# Patient Record
Sex: FEMALE | Race: BLACK OR AFRICAN AMERICAN | NOT HISPANIC OR LATINO | Employment: PART TIME | URBAN - METROPOLITAN AREA
[De-identification: names, ages, dates, MRNs, and addresses within clinical notes are randomized per-mention and may not be internally consistent; named-entity substitution may affect disease eponyms.]

---

## 2021-01-04 ENCOUNTER — HOSPITAL ENCOUNTER (EMERGENCY)
Facility: OTHER | Age: 31
Discharge: HOME | End: 2021-01-04
Attending: EMERGENCY MEDICINE
Payer: MEDICAID

## 2021-01-04 VITALS
TEMPERATURE: 98 F | HEART RATE: 72 BPM | DIASTOLIC BLOOD PRESSURE: 64 MMHG | WEIGHT: 210 LBS | RESPIRATION RATE: 16 BRPM | SYSTOLIC BLOOD PRESSURE: 134 MMHG | OXYGEN SATURATION: 98 % | HEIGHT: 62 IN | BODY MASS INDEX: 38.64 KG/M2

## 2021-01-04 DIAGNOSIS — S81.831A PUNCTURE WOUND OF RIGHT LOWER EXTREMITY: Primary | ICD-10-CM

## 2021-01-04 LAB
B-HCG UR QL: NEGATIVE
CTP QC/QA: YES
HCV AB SERPL QL IA: NEGATIVE
HIV 1+2 AB+HIV1 P24 AG SERPL QL IA: NEGATIVE

## 2021-01-04 PROCEDURE — 86703 HIV-1/HIV-2 1 RESULT ANTBDY: CPT

## 2021-01-04 PROCEDURE — 63600175 PHARM REV CODE 636 W HCPCS: Performed by: PHYSICIAN ASSISTANT

## 2021-01-04 PROCEDURE — 90715 TDAP VACCINE 7 YRS/> IM: CPT | Performed by: PHYSICIAN ASSISTANT

## 2021-01-04 PROCEDURE — 90471 IMMUNIZATION ADMIN: CPT | Performed by: PHYSICIAN ASSISTANT

## 2021-01-04 PROCEDURE — 99284 EMERGENCY DEPT VISIT MOD MDM: CPT | Mod: 25

## 2021-01-04 PROCEDURE — 81025 URINE PREGNANCY TEST: CPT | Performed by: PHYSICIAN ASSISTANT

## 2021-01-04 PROCEDURE — 25000003 PHARM REV CODE 250: Performed by: PHYSICIAN ASSISTANT

## 2021-01-04 PROCEDURE — 86803 HEPATITIS C AB TEST: CPT

## 2021-01-04 RX ORDER — KETOROLAC TROMETHAMINE 10 MG/1
10 TABLET, FILM COATED ORAL EVERY 6 HOURS
Qty: 12 TABLET | Refills: 0 | Status: SHIPPED | OUTPATIENT
Start: 2021-01-04 | End: 2021-01-07

## 2021-01-04 RX ORDER — KETOROLAC TROMETHAMINE 10 MG/1
10 TABLET, FILM COATED ORAL
Status: COMPLETED | OUTPATIENT
Start: 2021-01-04 | End: 2021-01-04

## 2021-01-04 RX ADMIN — CLOSTRIDIUM TETANI TOXOID ANTIGEN (FORMALDEHYDE INACTIVATED), CORYNEBACTERIUM DIPHTHERIAE TOXOID ANTIGEN (FORMALDEHYDE INACTIVATED), BORDETELLA PERTUSSIS TOXOID ANTIGEN (GLUTARALDEHYDE INACTIVATED), BORDETELLA PERTUSSIS FILAMENTOUS HEMAGGLUTININ ANTIGEN (FORMALDEHYDE INACTIVATED), BORDETELLA PERTUSSIS PERTACTIN ANTIGEN, AND BORDETELLA PERTUSSIS FIMBRIAE 2/3 ANTIGEN 0.5 ML: 5; 2; 2.5; 5; 3; 5 INJECTION, SUSPENSION INTRAMUSCULAR at 01:01

## 2021-01-04 RX ADMIN — KETOROLAC TROMETHAMINE 10 MG: 10 TABLET, FILM COATED ORAL at 01:01

## 2021-04-16 ENCOUNTER — PATIENT MESSAGE (OUTPATIENT)
Dept: RESEARCH | Facility: HOSPITAL | Age: 31
End: 2021-04-16

## 2024-10-07 ENCOUNTER — HOSPITAL ENCOUNTER (EMERGENCY)
Facility: HOSPITAL | Age: 34
Discharge: HOME | End: 2024-10-07
Attending: EMERGENCY MEDICINE
Payer: MEDICAID

## 2024-10-07 VITALS
DIASTOLIC BLOOD PRESSURE: 89 MMHG | TEMPERATURE: 99 F | BODY MASS INDEX: 38.46 KG/M2 | OXYGEN SATURATION: 95 % | SYSTOLIC BLOOD PRESSURE: 146 MMHG | HEIGHT: 62 IN | RESPIRATION RATE: 16 BRPM | HEART RATE: 95 BPM | WEIGHT: 209 LBS

## 2024-10-07 DIAGNOSIS — T30.0 FIRST DEGREE BURN: Primary | ICD-10-CM

## 2024-10-07 PROCEDURE — 99281 EMR DPT VST MAYX REQ PHY/QHP: CPT
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
## 2024-11-22 ENCOUNTER — PATIENT MESSAGE (OUTPATIENT)
Dept: RESEARCH | Facility: HOSPITAL | Age: 34
End: 2024-11-22
Payer: MEDICAID

## 2024-12-17 ENCOUNTER — HOSPITAL ENCOUNTER (EMERGENCY)
Facility: HOSPITAL | Age: 34
Discharge: HOME | End: 2024-12-17
Attending: EMERGENCY MEDICINE
Payer: MEDICAID

## 2024-12-17 VITALS
TEMPERATURE: 99 F | HEART RATE: 103 BPM | WEIGHT: 215 LBS | RESPIRATION RATE: 20 BRPM | DIASTOLIC BLOOD PRESSURE: 82 MMHG | BODY MASS INDEX: 39.56 KG/M2 | SYSTOLIC BLOOD PRESSURE: 169 MMHG | OXYGEN SATURATION: 99 % | HEIGHT: 62 IN

## 2024-12-17 DIAGNOSIS — J06.9 UPPER RESPIRATORY TRACT INFECTION, UNSPECIFIED TYPE: ICD-10-CM

## 2024-12-17 DIAGNOSIS — R03.0 ELEVATED BLOOD PRESSURE READING: Primary | ICD-10-CM

## 2024-12-17 LAB
CTP QC/QA: YES
CTP QC/QA: YES
POC MOLECULAR INFLUENZA A AGN: NEGATIVE
POC MOLECULAR INFLUENZA B AGN: NEGATIVE
SARS-COV-2 RDRP RESP QL NAA+PROBE: NEGATIVE

## 2024-12-17 PROCEDURE — 87502 INFLUENZA DNA AMP PROBE: CPT

## 2024-12-17 PROCEDURE — 87635 SARS-COV-2 COVID-19 AMP PRB: CPT | Performed by: PHYSICIAN ASSISTANT

## 2024-12-17 PROCEDURE — 99283 EMERGENCY DEPT VISIT LOW MDM: CPT

## 2024-12-17 RX ORDER — CETIRIZINE HYDROCHLORIDE 10 MG/1
10 TABLET ORAL DAILY
Qty: 30 TABLET | Refills: 0 | Status: SHIPPED | OUTPATIENT
Start: 2024-12-17 | End: 2025-01-16

## 2024-12-17 RX ORDER — IBUPROFEN 600 MG/1
600 TABLET ORAL EVERY 6 HOURS PRN
Qty: 20 TABLET | Refills: 0 | Status: SHIPPED | OUTPATIENT
Start: 2024-12-17

## 2024-12-17 NOTE — Clinical Note
"Emmanuelle "Emmanuelle" Ross was seen and treated in our emergency department on 12/17/2024.  She may return to work on 12/18/2024.       If you have any questions or concerns, please don't hesitate to call.      Kimmy Pablo PA-C"

## 2024-12-17 NOTE — ED TRIAGE NOTES
Emmanuelle Hawkins, a 34 y.o. female presents to the ED w/ complaint of URI symptoms.     Triage note:  Chief Complaint   Patient presents with    Sore Throat     Pt has been suffering from sinus issues for weeks now- watery eyes, fever, post nasal drip, and facial pressure.      Review of patient's allergies indicates:   Allergen Reactions    Iodine and iodide containing products Hives     Past Medical History:   Diagnosis Date    Sickle cell trait      Awake, alert and aware of environment with age appropriate behavior. No acute distress noted. Skin is warm and dry with normal color. Airway is open and patent, respirations are spontaneous, unlabored with normal rate and effort. Abdomen is soft and non distended. Patient is moving all extremities spontaneously. No obvious musculoskeletal deformities noted.

## 2024-12-17 NOTE — DISCHARGE INSTRUCTIONS
You were tested today for COVID and influenza.Your tests were negative    I suspect you have a viral upper respiratory infection.  This is a self-limiting condition and does not require antibiotics.    I recommend Tylenol over-the-counter as needed for pain or fever.  Take as directed    I recommend that you take over-the-counter cold and flu medications for your symptoms.  Do not take decongestant products if you have high blood pressure.  A safe alternative is Coricidin HBP    Drink plenty of water and electrolyte containing fluids    Follow up with your PCP if your symptoms do not start to improve in 72 hours    Your blood pressure was elevated today.  Please follow up with your primary care provider for this finding.    Strict ED precautions given to return immediately for new, worsening, or concerning symptoms

## 2024-12-17 NOTE — ED PROVIDER NOTES
Encounter Date: 2024       History     Chief Complaint   Patient presents with    Sore Throat     Pt has been suffering from sinus issues for weeks now- watery eyes, fever, post nasal drip, and facial pressure.      34-year-old female with a PMHx of sickle cell trait presents to ED with URI symptoms x3 days.  Her symptoms include sinus congestion, runny nose, sore throat, headache, intermittently productive cough, fever. She is here with her daughter who has similar symptoms.  She is taking over-the-counter cold medications to treat her symptoms. she denies known sick contacts.    The history is provided by the patient.     Review of patient's allergies indicates:   Allergen Reactions    Iodine and iodide containing products Hives     Past Medical History:   Diagnosis Date    Sickle cell trait      Past Surgical History:   Procedure Laterality Date     SECTION       No family history on file.  Social History     Tobacco Use    Smoking status: Every Day     Current packs/day: 0.50     Types: Cigarettes    Smokeless tobacco: Never   Substance Use Topics    Alcohol use: Yes     Comment: beer every other day     Drug use: Yes     Types: Marijuana     Review of Systems   Constitutional:  Positive for fatigue and fever.   HENT:  Positive for congestion, rhinorrhea, sinus pressure, sneezing and sore throat.    Respiratory:  Positive for cough. Negative for shortness of breath.    Neurological:  Positive for headaches.       Physical Exam     Initial Vitals [24 1245]   BP Pulse Resp Temp SpO2   (!) 169/82 103 20 99.4 °F (37.4 °C) 99 %      MAP       --         Physical Exam    Nursing note and vitals reviewed.  Constitutional: She appears well-developed and well-nourished. She is not diaphoretic. No distress.   HENT:   Head: Normocephalic and atraumatic.   Right Ear: Tympanic membrane and external ear normal.   Left Ear: Tympanic membrane and external ear normal.   Nose: Nose normal. Mouth/Throat: Uvula  is midline. No oropharyngeal exudate, posterior oropharyngeal edema or posterior oropharyngeal erythema.   Eyes: Conjunctivae and EOM are normal.   Neck: Neck supple.   Cardiovascular:  Normal rate, regular rhythm, normal heart sounds and intact distal pulses.           Pulmonary/Chest: Breath sounds normal. No respiratory distress.   She speaks in full and complete sentences with good air movement   Musculoskeletal:      Cervical back: Neck supple.     Lymphadenopathy:     She has no cervical adenopathy.   Neurological: She is alert and oriented to person, place, and time. Gait normal.   Skin: No rash noted.   Psychiatric: She has a normal mood and affect. Thought content normal.         ED Course   Procedures  Labs Reviewed   POCT INFLUENZA A/B MOLECULAR       Result Value    POC Molecular Influenza A Ag Negative      POC Molecular Influenza B Ag Negative       Acceptable Yes     SARS-COV-2 RDRP GENE    POC Rapid COVID Negative       Acceptable Yes            Imaging Results    None          Medications - No data to display  Medical Decision Making  34-year-old female with a PMHx of sickle cell trait presents to ED with URI symptoms x3 days.Vitals with hypertension.  Afebrile. Exam as above.  I suspect patient has a viral upper respiratory infection given symptomatology her daughter having similar symptoms.  She is hypertensive though otherwise hemodynamically stable.   Differential diagnosis includes COVID, influenza, other viral illness, pneumonia.  Her COVID and flu tests are negative.  SpO2 of 99%.  Her lungs are clear on exam.  She is afebrile.  I have low suspicion for lower respiratory infection at this time.  She is well-appearing and nontoxic on exam. At this time she is stable for discharge.  I discussed symptomatic care with the patient with over-the-counter cold medications.  I also prescribed Zyrtec today for allergies per patient's request.  Strict ED precautions given  to return immediately for new, worsening, or concerning symptoms      Amount and/or Complexity of Data Reviewed  Labs: ordered. Decision-making details documented in ED Course.    Risk  OTC drugs.  Prescription drug management.               ED Course as of 12/17/24 1350   Tue Dec 17, 2024   1341 SARS-CoV-2 RNA, Amplification, Qual: Negative [HM]      ED Course User Index  [HM] Kimmy Pablo PA-C                           Clinical Impression:  Final diagnoses:  [R03.0] Elevated blood pressure reading (Primary)  [J06.9] Upper respiratory tract infection, unspecified type          ED Disposition Condition    Discharge Stable          ED Prescriptions       Medication Sig Dispense Start Date End Date Auth. Provider    cetirizine (ZYRTEC) 10 MG tablet Take 1 tablet (10 mg total) by mouth once daily. 30 tablet 12/17/2024 1/16/2025 Kimmy Pablo PA-C    ibuprofen (ADVIL,MOTRIN) 600 MG tablet Take 1 tablet (600 mg total) by mouth every 6 (six) hours as needed for Pain. 20 tablet 12/17/2024 -- Kimmy Pablo PA-C          Follow-up Information       Follow up With Specialties Details Why Contact Info    [facility]   As needed [address] [facility] [id number] [phone]      [facility] - Emergency Dept Emergency Medicine  If symptoms worsen [address] [facility] [address] [phone]             Kimmy Pablo PA-C  12/17/24 1352

## 2024-12-23 ENCOUNTER — HOSPITAL ENCOUNTER (EMERGENCY)
Facility: HOSPITAL | Age: 34
Discharge: HOME | End: 2024-12-23
Attending: EMERGENCY MEDICINE
Payer: MEDICAID

## 2024-12-23 VITALS
RESPIRATION RATE: 16 BRPM | HEIGHT: 62 IN | WEIGHT: 215 LBS | TEMPERATURE: 99 F | DIASTOLIC BLOOD PRESSURE: 86 MMHG | HEART RATE: 87 BPM | BODY MASS INDEX: 39.56 KG/M2 | OXYGEN SATURATION: 99 % | SYSTOLIC BLOOD PRESSURE: 145 MMHG

## 2024-12-23 DIAGNOSIS — M25.539 WRIST PAIN: ICD-10-CM

## 2024-12-23 DIAGNOSIS — R50.9 LOW GRADE FEVER: Primary | ICD-10-CM

## 2024-12-23 DIAGNOSIS — M25.561 RIGHT KNEE PAIN: ICD-10-CM

## 2024-12-23 PROCEDURE — 99284 EMERGENCY DEPT VISIT MOD MDM: CPT | Mod: 25

## 2024-12-23 PROCEDURE — 25000003 PHARM REV CODE 250: Performed by: EMERGENCY MEDICINE

## 2024-12-23 RX ORDER — ACETAMINOPHEN 500 MG
1000 TABLET ORAL
Status: COMPLETED | OUTPATIENT
Start: 2024-12-23 | End: 2024-12-23

## 2024-12-23 RX ORDER — IBUPROFEN 400 MG/1
400 TABLET ORAL
Status: COMPLETED | OUTPATIENT
Start: 2024-12-23 | End: 2024-12-23

## 2024-12-23 RX ADMIN — IBUPROFEN 400 MG: 400 TABLET ORAL at 12:12

## 2024-12-23 RX ADMIN — ACETAMINOPHEN 1000 MG: 500 TABLET ORAL at 12:12

## 2024-12-23 NOTE — DISCHARGE INSTRUCTIONS
Your x-rays did not show any signs of dangerous medical conditions.    At home you can take Tylenol for pain control as well as ibuprofen as long as you are sure you are not pregnant.    Please follow-up with your primary care doctor for continued outpatient evaluation.    You have a slight fever unclear cause please continue to monitor and follow up with your primary care doctor for continued fever if you have any concerning symptoms please return emergency department.
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
## 2025-02-05 NOTE — PROGRESS NOTES
Outpatient Rehab    Physical Therapy Evaluation (only)    Patient Name: Emmanuelle Hawkins  MRN: [id number]  YOB: 1990  Today's Date: 2025    Therapy Diagnosis:   Encounter Diagnoses   Name Primary?    Decreased ROM of right knee Yes    Decreased strength involving knee joint      Physician: Anny Jimenez NP    Physician Orders: Eval and Treat  Medical Diagnosis from Referral: M25.561 (ICD-10-CM) - Right medial knee pain   Evaluation Date: 2025  Authorization Period Expiration: 25 to 26  Plan of Care Expiration: 25 to 25  Visit # / Visits authorized:    Progress Note Due: 3/7/25  FOTO:     Time In: 0900   Time Out: 1000  Total Time: 60   Total Billable Time: 60    Precautions     Standard       Subjective   History of Present Illness  Emmanuelle is a 34 y.o. female who reports to physical therapy with a chief concern of medial R knee pain. According to the patient's chart, Emmanuelle has a past medical history of Sickle cell trait. Emmanuelle has a past surgical history that includes  section.    The patient reports a medical diagnosis of M25.561 (ICD-10-CM) - Right medial knee pain.    Diagnostic tests related to this condition: X-ray.   X-Ray Details: No acute displaced fracture or dislocation of the knee.  No radiopaque foreign body.  No large knee joint effusion.  There is anterior subcutaneous edema. Impression: 1. No acute displaced fracture or dislocation of the knee noting anterior subcutaneous edema.    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 Her knee is still swollen and red. She has difficulty walking long distances without having increased pain, swelling and throbbing sensation. Pain alleviate with ice and elevation. She gets muscle cramps with R knee  "flexion. She has throbbing sensation with ascending stairs and fear/pain while descending stairs in which she has numbness sensation with feeling of leg wanting to "give out". Patient does not trust her R knee. She does not take pain medication d/t fear of side effects. Ibuprofen slightly alleviate symptoms. She pain radiating from R medial knee to mid anterior thigh. Patient reports popping, locking and buckling in her R knee. Pain worsens with walking long distances, negotiating stairs, standing in static position, R knee flexion, bending/squatting and sleeping. Pain alleviated with Biofreeze, bath with Epsom salt, green alcohol and ice with elevation.    Pain     Patient reports a current pain level of 6/10. Pain at best is reported as 0/10. Pain at worst is reported as 10/10.   Location: medial R knee, inferior R patella  Clinical Progression (since onset): Stable  Pain Qualities: Throbbing, Sharp, Other (Comment)  Other Pain Qualities: grabbing, numb, hot  Pain-Relieving Factors: Other (Comment), Elevation, Ice  Other Pain-Relieving Factors: Biofreeze, bath with Epsom salt, green alcohol  Pain-Aggravating Factors: Other (Comment), Bending, Kneeling, Squatting, Sleeping, Stair climbing, Standing, Walking  Other Pain-Aggravating Factors: R knee flexion AROM/PROM         Treatment History  Treatments  Discharged From Past 30 Days: Outpatient therapy  Previously Received Treatments: Yes  Previous Treatments: Physical therapy    Living Arrangements  Living Situation  Housing: Home independently  Living Arrangements: Family members    Home Setup  Type of Structure: Apartment/condo  Home Access: Stairs with rails  Entrance Stairs - Number of Steps: 12  Entrance Stairs - Rails: Both  Number of Levels in Home: Two level        Employment  Patient reports: Does the patient's condition impact their ability to work?  Employment Status: Not employed    currently looking for work; used to be       Past Medical " History/Physical Systems Review:   Emmanuelle Hawkins  has a past medical history of Sickle cell trait.    Emmanuelle Hawkins  has a past surgical history that includes  section.    Emmanuelle has a current medication list which includes the following prescription(s): cetirizine and ibuprofen.    Review of patient's allergies indicates:   Allergen Reactions    Iodine and iodide containing products Hives        Objective   Posture                 Posture Alignment: slouched posture;trunk deviated left    Knee Observations  Right Knee Observations  Present: Straight Leg Raise Extensor Lag            Lower Extremity Sensation  General Lumbar/Lower Extremity Sensation  Intact: Right and Left  Right Lumbar/Lower Extremity Sensation  Intact: Light Touch, Sharp/Dull Discrimination, Static Two Point Discrimination, Dynamic Two Point Discrimination, Kinesthesia, and Proprioception  Right Lumbar/Lower Extremity Sensation Stocking Glove Pattern: No    Left Lumbar/Lower Extremity Sensation  Intact: Light Touch, Static Two Point Discrimination, Dynamic Two Point Discrimination, Sharp/Dull Discrimination, Kinesthesia, and Proprioception  Left Lumbar/Lower Extremity Sensation Stocking Glove Pattern: No              Knee Swelling  Location of Measurement Right  (cm) Left  (cm)   20 cm Above Joint Line       10 cm Vastus Medialis Oblique       At Joint Line       15 cm Below Joint Line        Edema: minimal R knee swelling 10 cm above compared to L (R: 50 cm; L: 46 cm)         Knee Palpation      Palpation: tenderness to palpation detected along inferior R patella and medial R patella. R quad tightness.                   Knee Range of Motion   Right Knee   Active (deg) Passive (deg) Pain   Flexion 83 90 Yes   Extension 0           Left Knee   Active (deg) Passive (deg) Pain   Flexion  (WFL)       Extension  (WFL)                          Hip Strength - Planes of Motion   Right Strength Right Pain Left Strength Left  Pain   Flexion (L2) 4-    5     Extension 4-   5     ABduction 4-   5     ADduction 4-   5     Internal Rotation 4-   5     External Rotation 4-   5         Knee Strength   Right Strength Right Pain Left Strength Left  Pain   Flexion (S2) 3-   5     Prone Flexion     5     Extension (L3) 4-   5       Knee Extensor Lag  Lag Present: Right       Ankle/Foot Strength - Planes of Motion   Right Strength Right Pain Left Strength Left  Pain   Dorsiflexion (L4) 4-   5     Plantar Flexion (S1) 4   5     Inversion     5     Eversion     5     Great Toe Flexion     5     Great Toe Extension (L5)     5     Lesser Toes Flexion     5     Lesser Toes Extension     5            Hip Special Tests          Knee Special Tests  Knee Ligament Tests  Positive: Right Anterior Drawer  Positive: Right Valgus Stress at 0 Degrees       Knee Meniscal Tests  Positive: Right Apley's Compression and Right Medial Sawyer       R quad tightness    Other Knee Tests: positive Thessaly's and Patellar Grind Tests    Step down test: Positive Squat: Positive; excessive weight shift onto LLE during mini squat  Single leg balance: Negative; inability to withstand <5 seconds             Gait Analysis  Gait Analysis Details   GAIT DEVIATIONS: Emmanuelle displays antalgic gait;occasional unsteady gait;decreased weight shift;decreased step length; limited knee flexion          CMS Impairment/Limitation/Restriction for FOTO Knee Survey    Therapist reviewed FOTO scores for Emmanuelle Hawkins on 2/7/2025.   FOTO documents entered into [employer] - see Media section.    Limitation Score: 36% (47.5 KOOS)   Goal Score: 64% (65 KOOS)            Intake Outcome Measure for FOTO Survey    Therapist reviewed FOTO scores for Emmanuelle Hawkins on 2/7/2025.   FOTO report - see Media section or FOTO account episode details.     Intake Score: 64%    Patient's spiritual, cultural, and educational needs considered and patient agreeable to plan of care and goals.     Assessment & Plan   Assessment  Emmanuelle presents with a  "condition of Low complexity.   Presentation of Symptoms: Stable  Will Comorbidities Impact Care: No       Functional Limitations: Activity tolerance, Ambulating on uneven surfaces, Completing work/school activities, Decreased ambulation distance/endurance, Disrupted sleep pattern, Functional mobility, Gait limitations, Increased risk of fall, Maintaining balance, Painful locomotion/ambulation, Participating in leisure activities, Performing household chores, Squatting, Standing tolerance, Transfers  Impairments: Abnormal gait, Abnormal muscle firing, Abnormal muscle tone, Abnormal coordination, Abnormal or restricted range of motion, Activity intolerance, Impaired balance, Impaired physical strength, Pain with functional activity, Safety issue, Weight-bearing intolerance  Personal Factors Affecting Prognosis: Pain, Physical limitations    Patient Goal for Therapy (PT): "be able to have lori in my knee and have my freedom back"  Prognosis: Good  Assessment Details: Emmanuelle is a 34 y.o. female referred to outpatient Physical Therapy with a medical diagnosis of M25.561 (ICD-10-CM) - Right medial knee pain . Pt presents with chronic medial R knee pain with radiating symptoms into mid R thigh during WB activities such as walking/standing, transitional movements and negotiation of stairs. Noted impaired muscular endurance, limited R knee flexion active/passive range of motion, quad stability and generalized RLE weakness. BLE sensation appears to be intact. Noted minimal R knee swelling compared to L with 4 cm difference. Plan to focus sessions on RLE/knee strengthening activities, R quad stability exercises, static and dynamic balance activities, stretches and active assisted range of motion exercises to reduce muscle tension, improve flexibility, range of motion, strength, endurance and stability. Plan to assess static balance NV. POC based on observation in clinic, subjective reports and objective measurements. Educated " patient on importance of energy conservation techniques, performing exercises in pain free range of motion and importance of HEP compliance to assist with progression towards therapy goals. Plan to continue focusing sessions on improved stabilization, strength, endurance and reduce pain symptoms.      Plan  From a physical therapy perspective, the patient would benefit from: Skilled Rehab Services    Planned therapy interventions include: Therapeutic exercise, Therapeutic activities, Manual therapy, Neuromuscular re-education, and Gait training.    Planned modalities to include: Cryotherapy (cold pack), Other (Comment), Electrical stimulation - passive/unattended, and Thermotherapy (hot pack). TDN (trigger point dry needling)       Visit Frequency: 2 times Per Week for 10 Weeks.       This plan was discussed with Patient.   Discussion participants: Agreed Upon Plan of Care  Plan details: Pt will benefit from skilled outpatient Physical Therapy to address the deficits stated above and in the chart below, provide pt/family education, and to maximize pt's level of independence.            Goals:   Active       Short term        Report decreased in pain at worse less than  <   / =  8  /10  to increase tolerance for functional mobility.On going       Start:  02/07/25    Expected End:  03/07/25            Pt to improve R knee flexion active range of motion by 25% to allow for improved functional mobility to allow for improvement in IADL's. .On going       Start:  02/07/25    Expected End:  03/07/25            Increased RLE MMT 1/2 grade to increase tolerance for ADL and work activities.On going       Start:  02/07/25    Expected End:  03/07/25            Pt to report ability to walk 2 blocks or more with minimal to no complications indicating improved tolerance to activity. On going       Start:  02/07/25    Expected End:  03/07/25            Pt to tolerate HEP to improve ROM and independence with ADL's.On going         Start:  02/07/25    Expected End:  03/07/25               long term        Report decreased in pain at worse less than  <   / =  4  /10  to increase tolerance for functional mobility. On going       Start:  02/07/25    Expected End:  04/18/25            Pt to improve R knee flexion active range of motion by 75% to allow for improved functional mobility to allow for improvement in IADL's. On going       Start:  02/07/25    Expected End:  04/18/25            Increased RLE MMT 1 grade to increase tolerance for ADL and work activities.On going       Start:  02/07/25    Expected End:  04/18/25            Pt will report 64% or more on FOTO knee survey  to demonstrate increase in LE function with every day tasks. On going       Start:  02/07/25    Expected End:  04/18/25            Pt to be Independent with HEP to improve ROM and independence with ADL's. On going        Start:  02/07/25    Expected End:  04/18/25                Bruna Grimes, PT

## 2025-02-07 ENCOUNTER — CLINICAL SUPPORT (OUTPATIENT)
Dept: REHABILITATION | Facility: OTHER | Age: 35
End: 2025-02-07
Payer: MEDICAID

## 2025-02-07 DIAGNOSIS — R29.898 DECREASED STRENGTH INVOLVING KNEE JOINT: ICD-10-CM

## 2025-02-07 DIAGNOSIS — M25.661 DECREASED ROM OF RIGHT KNEE: Primary | ICD-10-CM

## 2025-02-07 PROCEDURE — 97112 NEUROMUSCULAR REEDUCATION: CPT

## 2025-02-07 PROCEDURE — 97110 THERAPEUTIC EXERCISES: CPT

## 2025-02-07 NOTE — Clinical Note
2025  Anny Jimenez NP  [address] [facility] [address]    Dear Emmanuelle Hawkins,    The attached plan of care is being sent to you for review and reference.    You may indicate your approval by signing the document electronically, or by faxing/mailing a signed copy of the final page of this document back to the attention of Bruna Grimes, PT:         Plan of Care 25   Effective from: 2025  Effective to: 2025    Plan ID: [id number]            Participants as of Finalize on 2025    Name Type Comments Contact Info    Anny Jimenez NP Referring Provider  [phone]    Bruna Grimes PT Physical Therapist         Last Plan Note     Author: Bruna Grimes PT Status: Sign when Signing Visit Last edited: 2025  9:00 AM       Outpatient Rehab    Physical Therapy Evaluation (only)    Patient Name: Emmanuelle Hawkins  MRN: [id number]  YOB: 1990  Today's Date: 2025    Therapy Diagnosis:   Encounter Diagnoses   Name Primary?    Decreased ROM of right knee Yes    Decreased strength involving knee joint      Physician: Anny Jimenez NP    Physician Orders: Eval and Treat  Medical Diagnosis from Referral: M25.561 (ICD-10-CM) - Right medial knee pain   Evaluation Date: 2025  Authorization Period Expiration: 25 to 26  Plan of Care Expiration: 25 to 25  Visit # / Visits authorized:    Progress Note Due: 3/7/25  FOTO:     Time In: 0900   Time Out: 1000  Total Time: 60   Total Billable Time: 60    Precautions     Standard       Subjective   History of Present Illness  Emmanuelle is a 34 y.o. female who reports to physical therapy with a chief concern of medial R knee pain. According to the patient's chart, Emmanuelle has a past medical history of Sickle cell trait. Emmanuelle has a past surgical history that includes  section.    The patient reports a medical diagnosis of M25.561 (ICD-10-CM) - Right medial knee pain.    Diagnostic tests related  "to this condition: X-ray.   X-Ray Details: No acute displaced fracture or dislocation of the knee.  No radiopaque foreign body.  No large knee joint effusion.  There is anterior subcutaneous edema. Impression: 1. No acute displaced fracture or dislocation of the knee noting anterior subcutaneous edema.    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 Her knee is still swollen and red. She has difficulty walking long distances without having increased pain, swelling and throbbing sensation. Pain alleviate with ice and elevation. She gets muscle cramps with R knee flexion. She has throbbing sensation with ascending stairs and fear/pain while descending stairs in which she has numbness sensation with feeling of leg wanting to "give out". Patient does not trust her R knee. She does not take pain medication d/t fear of side effects. Ibuprofen slightly alleviate symptoms. She pain radiating from R medial knee to mid anterior thigh. Patient reports popping, locking and buckling in her R knee. Pain worsens with walking long distances, negotiating stairs, standing in static position, R knee flexion, bending/squatting and sleeping. Pain alleviated with Biofreeze, bath with Epsom salt, green alcohol and ice with elevation.    Pain     Patient reports a current pain level of 6/10. Pain at best is reported as 0/10. Pain at worst is reported as 10/10.   Location: medial R knee, inferior R patella  Clinical Progression (since onset): Stable  Pain Qualities: Throbbing, Sharp, Other (Comment)  Other Pain Qualities: grabbing, numb, hot  Pain-Relieving Factors: Other (Comment), Elevation, Ice  Other Pain-Relieving Factors: Biofreeze, bath with Epsom salt, green alcohol  Pain-Aggravating Factors: Other (Comment), Bending, " Kneeling, Squatting, Sleeping, Stair climbing, Standing, Walking  Other Pain-Aggravating Factors: R knee flexion AROM/PROM         Treatment History  Treatments  Discharged From Past 30 Days: Outpatient therapy  Previously Received Treatments: Yes  Previous Treatments: Physical therapy    Living Arrangements  Living Situation  Housing: Home independently  Living Arrangements: Family members    Home Setup  Type of Structure: Apartment/condo  Home Access: Stairs with rails  Entrance Stairs - Number of Steps: 12  Entrance Stairs - Rails: Both  Number of Levels in Home: Two level        Employment  Patient reports: Does the patient's condition impact their ability to work?  Employment Status: Not employed    currently looking for work; used to be       Past Medical History/Physical Systems Review:   Emmanuelle Hawkins  has a past medical history of Sickle cell trait.    Emmanuelle Hawkins  has a past surgical history that includes  section.    Emmanuelle has a current medication list which includes the following prescription(s): cetirizine and ibuprofen.    Review of patient's allergies indicates:   Allergen Reactions    Iodine and iodide containing products Hives        Objective   Posture                 Posture Alignment: slouched posture;trunk deviated left    Knee Observations  Right Knee Observations  Present: Straight Leg Raise Extensor Lag            Lower Extremity Sensation  General Lumbar/Lower Extremity Sensation  Intact: Right and Left  Right Lumbar/Lower Extremity Sensation  Intact: Light Touch, Sharp/Dull Discrimination, Static Two Point Discrimination, Dynamic Two Point Discrimination, Kinesthesia, and Proprioception  Right Lumbar/Lower Extremity Sensation Stocking Glove Pattern: No    Left Lumbar/Lower Extremity Sensation  Intact: Light Touch, Static Two Point Discrimination, Dynamic Two Point Discrimination, Sharp/Dull Discrimination, Kinesthesia, and Proprioception  Left Lumbar/Lower Extremity Sensation  Stocking Glove Pattern: No              Knee Swelling  Location of Measurement Right  (cm) Left  (cm)   20 cm Above Joint Line       10 cm Vastus Medialis Oblique       At Joint Line       15 cm Below Joint Line        Edema: minimal R knee swelling 10 cm above compared to L (R: 50 cm; L: 46 cm)         Knee Palpation      Palpation: tenderness to palpation detected along inferior R patella and medial R patella. R quad tightness.                   Knee Range of Motion   Right Knee   Active (deg) Passive (deg) Pain   Flexion 83 90 Yes   Extension 0           Left Knee   Active (deg) Passive (deg) Pain   Flexion  (WFL)       Extension  (WFL)                          Hip Strength - Planes of Motion   Right Strength Right Pain Left Strength Left  Pain   Flexion (L2) 4-   5     Extension 4-   5     ABduction 4-   5     ADduction 4-   5     Internal Rotation 4-   5     External Rotation 4-   5         Knee Strength   Right Strength Right Pain Left Strength Left  Pain   Flexion (S2) 3-   5     Prone Flexion     5     Extension (L3) 4-   5       Knee Extensor Lag  Lag Present: Right       Ankle/Foot Strength - Planes of Motion   Right Strength Right Pain Left Strength Left  Pain   Dorsiflexion (L4) 4-   5     Plantar Flexion (S1) 4   5     Inversion     5     Eversion     5     Great Toe Flexion     5     Great Toe Extension (L5)     5     Lesser Toes Flexion     5     Lesser Toes Extension     5            Hip Special Tests          Knee Special Tests  Knee Ligament Tests  Positive: Right Anterior Drawer  Positive: Right Valgus Stress at 0 Degrees       Knee Meniscal Tests  Positive: Right Apley's Compression and Right Medial Sawyer       R quad tightness    Other Knee Tests: positive Thessaly's and Patellar Grind Tests    Step down test: Positive Squat: Positive; excessive weight shift onto LLE during mini squat  Single leg balance: Negative; inability to withstand <5 seconds             Gait Analysis  Gait Analysis  "Details   GAIT DEVIATIONS: Emmanuelle displays antalgic gait;occasional unsteady gait;decreased weight shift;decreased step length; limited knee flexion          CMS Impairment/Limitation/Restriction for FOTO Knee Survey    Therapist reviewed FOTO scores for Emmanuelle Hawkins on 2/7/2025.   FOTO documents entered into EP[employer] - see Media section.    Limitation Score: 36% (47.5 KOOS)   Goal Score: 64% (65 KOOS)            Intake Outcome Measure for FOTO Survey    Therapist reviewed FOTO scores for Emmanuelle Hawkins on 2/7/2025.   FOTO report - see Media section or FOTO account episode details.     Intake Score: 64%    Patient's spiritual, cultural, and educational needs considered and patient agreeable to plan of care and goals.     Assessment & Plan   Assessment  Emmanuelle presents with a condition of Low complexity.   Presentation of Symptoms: Stable  Will Comorbidities Impact Care: No       Functional Limitations: Activity tolerance, Ambulating on uneven surfaces, Completing work/school activities, Decreased ambulation distance/endurance, Disrupted sleep pattern, Functional mobility, Gait limitations, Increased risk of fall, Maintaining balance, Painful locomotion/ambulation, Participating in leisure activities, Performing household chores, Squatting, Standing tolerance, Transfers  Impairments: Abnormal gait, Abnormal muscle firing, Abnormal muscle tone, Abnormal coordination, Abnormal or restricted range of motion, Activity intolerance, Impaired balance, Impaired physical strength, Pain with functional activity, Safety issue, Weight-bearing intolerance  Personal Factors Affecting Prognosis: Pain, Physical limitations    Patient Goal for Therapy (PT): "be able to have lori in my knee and have my freedom back"  Prognosis: Good  Assessment Details: Emmanuelle is a 34 y.o. female referred to outpatient Physical Therapy with a medical diagnosis of M25.561 (ICD-10-CM) - Right medial knee pain . Pt presents with chronic medial R knee pain with " radiating symptoms into mid R thigh during WB activities such as walking/standing, transitional movements and negotiation of stairs. Noted impaired muscular endurance, limited R knee flexion active/passive range of motion, quad stability and generalized RLE weakness. BLE sensation appears to be intact. Noted minimal R knee swelling compared to L with 4 cm difference. Plan to focus sessions on RLE/knee strengthening activities, R quad stability exercises, static and dynamic balance activities, stretches and active assisted range of motion exercises to reduce muscle tension, improve flexibility, range of motion, strength, endurance and stability. Plan to assess static balance NV. POC based on observation in clinic, subjective reports and objective measurements. Educated patient on importance of energy conservation techniques, performing exercises in pain free range of motion and importance of HEP compliance to assist with progression towards therapy goals. Plan to continue focusing sessions on improved stabilization, strength, endurance and reduce pain symptoms.      Plan  From a physical therapy perspective, the patient would benefit from: Skilled Rehab Services    Planned therapy interventions include: Therapeutic exercise, Therapeutic activities, Manual therapy, Neuromuscular re-education, and Gait training.    Planned modalities to include: Cryotherapy (cold pack), Other (Comment), Electrical stimulation - passive/unattended, and Thermotherapy (hot pack). TDN (trigger point dry needling)       Visit Frequency: 2 times Per Week for 10 Weeks.       This plan was discussed with Patient.   Discussion participants: Agreed Upon Plan of Care  Plan details: Pt will benefit from skilled outpatient Physical Therapy to address the deficits stated above and in the chart below, provide pt/family education, and to maximize pt's level of independence.            Goals:   Active       Short term        Report decreased in pain at  worse less than  <   / =  8  /10  to increase tolerance for functional mobility.On going       Start:  02/07/25    Expected End:  03/07/25            Pt to improve R knee flexion active range of motion by 25% to allow for improved functional mobility to allow for improvement in IADL's. .On going       Start:  02/07/25    Expected End:  03/07/25            Increased RLE MMT 1/2 grade to increase tolerance for ADL and work activities.On going       Start:  02/07/25    Expected End:  03/07/25            Pt to report ability to walk 2 blocks or more with minimal to no complications indicating improved tolerance to activity. On going       Start:  02/07/25    Expected End:  03/07/25            Pt to tolerate HEP to improve ROM and independence with ADL's.On going        Start:  02/07/25    Expected End:  03/07/25               long term        Report decreased in pain at worse less than  <   / =  4  /10  to increase tolerance for functional mobility. On going       Start:  02/07/25    Expected End:  04/18/25            Pt to improve R knee flexion active range of motion by 75% to allow for improved functional mobility to allow for improvement in IADL's. On going       Start:  02/07/25    Expected End:  04/18/25            Increased RLE MMT 1 grade to increase tolerance for ADL and work activities.On going       Start:  02/07/25    Expected End:  04/18/25            Pt will report 64% or more on FOTO knee survey  to demonstrate increase in LE function with every day tasks. On going       Start:  02/07/25    Expected End:  04/18/25            Pt to be Independent with HEP to improve ROM and independence with ADL's. On going        Start:  02/07/25    Expected End:  04/18/25                Bruna Grimes PT             Current Participants as of 2/7/2025    Name Type Comments Contact Info    Anny Jimenez NP Referring Provider  [phone]    Signature pending    Bruna Grimes PT Physical Therapist                   Sincerely,      Bruna Grimes, PT  [facility]                                                            Dear Bruna Grimes, PT,    RE: Ms. Emmanuelle Hawkins, MRN: [id number]    I certify that I have reviewed the attached plan of care and agree to the details within.        ___________________________  ___________________________  Patient Printed Name    Patient Signed Name      ___________________________  Date and Time

## 2025-02-13 ENCOUNTER — CLINICAL SUPPORT (OUTPATIENT)
Dept: REHABILITATION | Facility: OTHER | Age: 35
End: 2025-02-13
Payer: MEDICAID

## 2025-02-13 DIAGNOSIS — R29.898 DECREASED STRENGTH INVOLVING KNEE JOINT: ICD-10-CM

## 2025-02-13 DIAGNOSIS — M25.661 DECREASED ROM OF RIGHT KNEE: Primary | ICD-10-CM

## 2025-02-13 PROCEDURE — 97140 MANUAL THERAPY 1/> REGIONS: CPT

## 2025-02-13 NOTE — PROGRESS NOTES
Outpatient Rehab    Physical Therapy Visit    Patient Name: Emmanuelle Hawkins  MRN: [id number]  YOB: 1990  Today's Date: 2/13/2025    Therapy Diagnosis:   Encounter Diagnoses   Name Primary?    Decreased ROM of right knee Yes    Decreased strength involving knee joint      Physician: Anny Jimenez NP    Physician Orders: Eval and Treat  Medical Diagnosis from Referral: M25.561 (ICD-10-CM) - Right medial knee pain   Evaluation Date: 2/7/2025  Authorization Period Expiration: 1/17/25 to 1/17/26  Plan of Care Expiration: 1/17/25 to 4/18/25  Visit # / Visits authorized: 1/ 1   Progress Note Due: 3/7/25  FOTO: 1/ 1        Time In: 1015   Time Out: 1100  Total Time: 45   Total Billable Time: 40    FOTO:  Intake Score:  %  Survey Score 1:  %  Survey Score 2:  %         Subjective   Patient reports severe R knee pain after nearly falling as she tripped over bag of groceries on a bus. She noticed increased swelling and pain after despite icicing knee with elevation. Walking is painful. Her R knee feels numb. Patient refuse to take pain medication prescribed d/t side effects. She was compliant with HEP after IE. She noticed she's able to sleep better without being waken up by R knee pain or muscle spasms after placing pilllow between her legs as recommended by therapist. Patient reports feeling better after today's session..  Pain reported as 10/10.      Objective            Treatment:  Manual Therapy  Manual Therapy Activity 2: retrograde massage to R knee for swelling reduction  Manual Therapy Activity 3: kinesotape application to R knee to reduce swelling and provide patella support    Modalities  Cryotherapy (Minutes\Location): 5  Electrical Stimulation (Parameters): IFC to R knee, supine position c/ BLE propped, lvl 18, 118 Hz         Assessment & Plan   Assessment: Pt presents to therapy with severe R knee pain and moderate swelling post near fall. She ambulated into clinic with antalgic gait during RLE WB,  excessive R knee flexion during stance phases, reduced step length and decreased weight shift onto RLE. Ambulated with patient using SPC with SBA as patient demonstrated 3 pt gait pattern. Therapy session focused manual therapy in attempt to reduce pain such as retrograde massage to reduce swelling, kinesiotape application to further reduce swelling as well as patella support to offload WB, IFC TENS unit for pain relief followed by ice to reduce swelling. Patient responded well to manual therapy. Will continue to monitor and progress exercises as tolerated by pt to reduce swelling, improve strength, stability, endurance and ROM.  Evaluation/Treatment Tolerance: Patient limited by pain    Patient will continue to benefit from skilled outpatient physical therapy to address the deficits listed in the problem list box on initial evaluation, provide pt/family education and to maximize pt's level of independence in the home and community environment.     Patient's spiritual, cultural, and educational needs considered and patient agreeable to plan of care and goals.           Plan:      Goals:   Active       Short term        Report decreased in pain at worse less than  <   / =  8  /10  to increase tolerance for functional mobility.On going       Start:  02/07/25    Expected End:  03/07/25            Pt to improve R knee flexion active range of motion by 25% to allow for improved functional mobility to allow for improvement in IADL's. .On going       Start:  02/07/25    Expected End:  03/07/25            Increased RLE MMT 1/2 grade to increase tolerance for ADL and work activities.On going       Start:  02/07/25    Expected End:  03/07/25            Pt to report ability to walk 2 blocks or more with minimal to no complications indicating improved tolerance to activity. On going       Start:  02/07/25    Expected End:  03/07/25            Pt to tolerate HEP to improve ROM and independence with ADL's.On going        Start:   02/07/25    Expected End:  03/07/25               long term        Report decreased in pain at worse less than  <   / =  4  /10  to increase tolerance for functional mobility. On going       Start:  02/07/25    Expected End:  04/18/25            Pt to improve R knee flexion active range of motion by 75% to allow for improved functional mobility to allow for improvement in IADL's. On going       Start:  02/07/25    Expected End:  04/18/25            Increased RLE MMT 1 grade to increase tolerance for ADL and work activities.On going       Start:  02/07/25    Expected End:  04/18/25            Pt will report 64% or more on FOTO knee survey  to demonstrate increase in LE function with every day tasks. On going       Start:  02/07/25    Expected End:  04/18/25            Pt to be Independent with HEP to improve ROM and independence with ADL's. On going        Start:  02/07/25    Expected End:  04/18/25                Bruna Grimes, PT

## 2025-02-28 ENCOUNTER — CLINICAL SUPPORT (OUTPATIENT)
Dept: REHABILITATION | Facility: OTHER | Age: 35
End: 2025-02-28
Payer: MEDICAID

## 2025-02-28 DIAGNOSIS — M25.661 DECREASED ROM OF RIGHT KNEE: Primary | ICD-10-CM

## 2025-02-28 DIAGNOSIS — R29.898 DECREASED STRENGTH INVOLVING KNEE JOINT: ICD-10-CM

## 2025-02-28 PROCEDURE — 97110 THERAPEUTIC EXERCISES: CPT

## 2025-02-28 NOTE — PROGRESS NOTES
"  Outpatient Rehab    Physical Therapy Visit    Patient Name: Emmanuelle Hawkins  MRN: [id number]  YOB: 1990  Encounter Date: 2/28/2025    Therapy Diagnosis:   Encounter Diagnoses   Name Primary?    Decreased ROM of right knee Yes    Decreased strength involving knee joint      Physician: Anny Jimenez NP    Physician Orders: Eval and Treat  Medical Diagnosis from Referral: M25.561 (ICD-10-CM) - Right medial knee pain   Evaluation Date: 2/7/2025  Authorization Period Expiration: 1/17/25 to 1/17/26  Plan of Care Expiration: 1/17/25 to 4/18/25  Visit # / Visits authorized: 3/20   Progress Note Due: 3/7/25  FOTO: 1/ 1     Time In: 0900   Time Out: 0945  Total Time: 45   Total Billable Time: 45    FOTO:  Intake Score:  %  Survey Score 1:  %  Survey Score 2:  %         Subjective   Patient reports moderate R kne pain and increased swelling..  Pain reported as 5/10.      Objective            Treatment:  Therapeutic Exercise  Therapeutic Exercise Activity 1: SAQ, 2x10x3"  Therapeutic Exercise Activity 2: LAQ c/ focus on eccentric control, 2x10x3"  Therapeutic Exercise Activity 3: SLR c/ quad activation, 2x10x3"  Therapeutic Exercise Activity 4: seated assisted knee flexion c OP, x10x10"  Therapeutic Exercise Activity 5: +tailgaters, 2'  Therapeutic Exercise Activity 6: +seated hamstring curls, 2x10x3"  Therapeutic Exercise Activity 7: +quad set, 2x10x3"  Therapeutic Exercise Activity 10: NV: mini squats, quad stretch, tandem stance, SL balance, standing hip ABD/Ext, lateral side steps, monster walks, SL clamshells, iso hip ADD, supine heel slides c/ OP              Assessment & Plan   Assessment: Pt presents to clinic with moderate R knee pain and increased swelling. Noted improved gait as she demonstrates less antalgic gait and limping with increased RLE stance phase time. Noted limited R knee flexion AROM during swing phases in gait as well as on bike during aerobic conditioning. Added additional quad and " hamstring strengtening activities with good patient feedback. Will continue to monitor and progress exercises as tolerated by pt to further address impairments and improve overall functional mobiltiy.  Evaluation/Treatment Tolerance: Patient limited by pain    Patient will continue to benefit from skilled outpatient physical therapy to address the deficits listed in the problem list box on initial evaluation, provide pt/family education and to maximize pt's level of independence in the home and community environment.     Patient's spiritual, cultural, and educational needs considered and patient agreeable to plan of care and goals.           Plan: Will continue with current POC as tolerated by pt.    Goals:   Active       Short term        Report decreased in pain at worse less than  <   / =  8  /10  to increase tolerance for functional mobility.On going       Start:  02/07/25    Expected End:  03/07/25            Pt to improve R knee flexion active range of motion by 25% to allow for improved functional mobility to allow for improvement in IADL's. .On going       Start:  02/07/25    Expected End:  03/07/25            Increased RLE MMT 1/2 grade to increase tolerance for ADL and work activities.On going       Start:  02/07/25    Expected End:  03/07/25            Pt to report ability to walk 2 blocks or more with minimal to no complications indicating improved tolerance to activity. On going       Start:  02/07/25    Expected End:  03/07/25            Pt to tolerate HEP to improve ROM and independence with ADL's.On going        Start:  02/07/25    Expected End:  03/07/25               long term        Report decreased in pain at worse less than  <   / =  4  /10  to increase tolerance for functional mobility. On going       Start:  02/07/25    Expected End:  04/18/25            Pt to improve R knee flexion active range of motion by 75% to allow for improved functional mobility to allow for improvement in IADL's. On  going       Start:  02/07/25    Expected End:  04/18/25            Increased RLE MMT 1 grade to increase tolerance for ADL and work activities.On going       Start:  02/07/25    Expected End:  04/18/25            Pt will report 64% or more on FOTO knee survey  to demonstrate increase in LE function with every day tasks. On going       Start:  02/07/25    Expected End:  04/18/25            Pt to be Independent with HEP to improve ROM and independence with ADL's. On going        Start:  02/07/25    Expected End:  04/18/25                Bruna Grimes, PT

## 2025-03-11 ENCOUNTER — CLINICAL SUPPORT (OUTPATIENT)
Dept: REHABILITATION | Facility: OTHER | Age: 35
End: 2025-03-11
Payer: MEDICAID

## 2025-03-11 DIAGNOSIS — M25.661 DECREASED ROM OF RIGHT KNEE: Primary | ICD-10-CM

## 2025-03-11 DIAGNOSIS — R29.898 DECREASED STRENGTH INVOLVING KNEE JOINT: ICD-10-CM

## 2025-03-11 PROCEDURE — 97140 MANUAL THERAPY 1/> REGIONS: CPT | Mod: CQ

## 2025-03-11 PROCEDURE — 97110 THERAPEUTIC EXERCISES: CPT | Mod: CQ

## 2025-03-11 NOTE — PROGRESS NOTES
"  Outpatient Rehab    Physical Therapy Visit    Patient Name: Emmanuelle Hawkins  MRN: [id number]  YOB: 1990  Encounter Date: 3/11/2025    Therapy Diagnosis:   Encounter Diagnoses   Name Primary?    Decreased ROM of right knee Yes    Decreased strength involving knee joint        Physician: Anny Jimenez NP    Physician Orders: Eval and Treat  Medical Diagnosis from Referral: M25.561 (ICD-10-CM) - Right medial knee pain   Evaluation Date: 2/7/2025  Authorization Period Expiration: 1/17/25 to 1/17/26  Plan of Care Expiration: 1/17/25 to 4/18/25  Visit # / Visits authorized: 3/20   Progress Note Due: 3/7/25  FOTO: 1/ 1     Time In: 1100   Time Out: 1140  Total Time: 40   Total Billable Time: 40    FOTO:  Intake Score:  %  Survey Score 1:  %  Survey Score 2:  %         Subjective   Her knee is throbbing and is still swollen. She is afraid to use it becuase it has given out sometimes..  Pain reported as 5/10.      Objective            Treatment:  Therapeutic Exercise  Therapeutic Exercise Activity 1: SAQ, 2x10x3"  Therapeutic Exercise Activity 2: LAQ c/ focus on eccentric control, 2x10x3"  Therapeutic Exercise Activity 3: SLR c/ quad activation, 2x10x3"  Therapeutic Exercise Activity 4: seated assisted knee flexion c OP, x10x10"  Therapeutic Exercise Activity 5: tailgaters, 2'  Therapeutic Exercise Activity 6: seated hamstring curls, 2x10x3"  Therapeutic Exercise Activity 7: Bridges 2 x 10, Hip add iso  x 10 x 3"  Therapeutic Exercise Activity 8: SL clams x 20              Assessment & Plan   Assessment: Pt very sensative to light touch around knee joint. Pt reports being fearful of WB and does not tolerated any pressure around knee. Pt taped to improve patella stability.  Evaluation/Treatment Tolerance: Patient limited by pain    Patient will continue to benefit from skilled outpatient physical therapy to address the deficits listed in the problem list box on initial evaluation, provide pt/family education and " to maximize pt's level of independence in the home and community environment.     Patient's spiritual, cultural, and educational needs considered and patient agreeable to plan of care and goals.           Plan:      Goals:   Active       Short term        Report decreased in pain at worse less than  <   / =  8  /10  to increase tolerance for functional mobility.On going       Start:  02/07/25    Expected End:  03/07/25            Pt to improve R knee flexion active range of motion by 25% to allow for improved functional mobility to allow for improvement in IADL's. .On going       Start:  02/07/25    Expected End:  03/07/25            Increased RLE MMT 1/2 grade to increase tolerance for ADL and work activities.On going       Start:  02/07/25    Expected End:  03/07/25            Pt to report ability to walk 2 blocks or more with minimal to no complications indicating improved tolerance to activity. On going       Start:  02/07/25    Expected End:  03/07/25            Pt to tolerate HEP to improve ROM and independence with ADL's.On going        Start:  02/07/25    Expected End:  03/07/25               long term        Report decreased in pain at worse less than  <   / =  4  /10  to increase tolerance for functional mobility. On going       Start:  02/07/25    Expected End:  04/18/25            Pt to improve R knee flexion active range of motion by 75% to allow for improved functional mobility to allow for improvement in IADL's. On going       Start:  02/07/25    Expected End:  04/18/25            Increased RLE MMT 1 grade to increase tolerance for ADL and work activities.On going       Start:  02/07/25    Expected End:  04/18/25            Pt will report 64% or more on FOTO knee survey  to demonstrate increase in LE function with every day tasks. On going       Start:  02/07/25    Expected End:  04/18/25            Pt to be Independent with HEP to improve ROM and independence with ADL's. On going        Start:   02/07/25    Expected End:  04/18/25                Zay Silva, PTA

## 2025-03-14 ENCOUNTER — CLINICAL SUPPORT (OUTPATIENT)
Dept: REHABILITATION | Facility: OTHER | Age: 35
End: 2025-03-14
Payer: MEDICAID

## 2025-03-14 DIAGNOSIS — R29.898 DECREASED STRENGTH INVOLVING KNEE JOINT: ICD-10-CM

## 2025-03-14 DIAGNOSIS — M25.661 DECREASED ROM OF RIGHT KNEE: Primary | ICD-10-CM

## 2025-03-14 PROCEDURE — 97140 MANUAL THERAPY 1/> REGIONS: CPT

## 2025-03-14 PROCEDURE — 97110 THERAPEUTIC EXERCISES: CPT

## 2025-03-14 NOTE — PROGRESS NOTES
Outpatient Rehab    Physical Therapy Progress Note    Patient Name: Emmanuelle Hawkins  MRN: [id number]  YOB: 1990  Encounter Date: 3/14/2025    Therapy Diagnosis:   Encounter Diagnoses   Name Primary?    Decreased ROM of right knee Yes    Decreased strength involving knee joint      Physician: Anny Jimenez NP    Physician Orders: Eval and Treat  Medical Diagnosis from Referral: M25.561 (ICD-10-CM) - Right medial knee pain   Evaluation Date: 2/7/2025  Authorization Period Expiration: 1/17/25 to 1/17/26  Plan of Care Expiration: 1/17/25 to 4/18/25  Visit # / Visits authorized: 5/20   Progress Note Due: 4/14/25  FOTO: 1/ 1     Time In: 1235   Time Out: 1333  Total Time: 58   Total Billable Time: 53    FOTO:  Intake Score:  %  Survey Score 1:  %  Survey Score 2:  %         Subjective   Patient reports her knee is feeling better today with minimal pain..  Pain reported as 2/10.      Objective       Knee Observations  Right Knee Observations  No longer present: Straight Leg Raise Extensor Lag      Edema: minimal R knee swelling     Knee Range of Motion   Right Knee    Active (deg) Passive (deg) Pain   Flexion 112 122 Yes   Extension 0             Left Knee    Active (deg) Passive (deg) Pain   Flexion  (WFL)       Extension  (WFL)             Hip Strength - Planes of Motion    Right Strength Right Pain Left Strength Left  Pain   Flexion (L2) 4   5     Extension 4   5     ABduction 4   5     ADduction 4   5     Internal Rotation 4   5     External Rotation 4   5           Knee Strength    Right Strength Right Pain Left Strength Left  Pain   Flexion (S2) 3+   5     Prone Flexion     5     Extension (L3) 4   5            Ankle/Foot Strength - Planes of Motion    Right Strength Right Pain Left Strength Left  Pain   Dorsiflexion (L4) 4   5     Plantar Flexion (S1) 4+   5     Inversion     5     Eversion     5     Great Toe Flexion     5     Great Toe Extension (L5)     5     Lesser Toes Flexion     5     Lesser Toes  "Extension     5        CMS Impairment/Limitation/Restriction for FOTO Knee Survey     Therapist reviewed FOTO scores for Emmanuelle Hawkins on 2/7/2025.   FOTO documents entered into [employer] - see Media section.     Limitation Score: 36% (47.5 KOOS)   Goal Score: 64% (65 KOOS)         Treatment:  Therapeutic Exercise  Therapeutic Exercise Activity 1: SAQ, 2x10x3"  Therapeutic Exercise Activity 2: LAQ c/ focus on eccentric control, 2x10x3"  Therapeutic Exercise Activity 3: SLR c/ quad activation, 2x10x3"  Therapeutic Exercise Activity 4: seated assisted knee flexion c OP, x10x10"  Therapeutic Exercise Activity 5: tailgaters, 2'  Therapeutic Exercise Activity 6: seated hamstring curls, 2x10x3"  Therapeutic Exercise Activity 7: quad set, 2x10x3"  Therapeutic Exercise Activity 8: SL clams, RTB x 20  Therapeutic Exercise Activity 9: +Nustep, 6'  Therapeutic Exercise Activity 10: +standing hip ABD/Ext, 2x10    Manual Therapy  Manual Therapy Activity 1: R knee flexion PROM    Balance/Neuromuscular Re-Education  Balance/Neuromuscular Re-Education Activity 1: +iso hip adduction, 2x10x5 sec  Balance/Neuromuscular Re-Education Activity 2: +tandem stance, x2x30 sec  Balance/Neuromuscular Re-Education Activity 3: NV: mini squats, quad stretch, SL balance, lateral side steps, monster walks, supine heel slides c/ OP              Modalities  Cryotherapy (Minutes\Location): 5/R knee    Assessment & Plan   Assessment: Pt presents to therapy with improved symptoms as she has minimal pain compared to previous sessions. Noted improved gait as patient exhibited reduced antalgic gait, less limping and increased josue. She performed exercises with pain/discomfort mostly with sidelying clamshells and WB activities. Added standing hip strengthening exercises and static balance to encourage RLE WB acceptance. Patient presents with poor RLE WB acceptance. Issued/reviewed updated HEP with patient understanding. PN performed with noted increased in " knee ROM and BLE strength. Will continue to monitor and progress exercises as tolerated by pt to further address impairment and improve overall functional mobility.  Evaluation/Treatment Tolerance: Patient tolerated treatment well    Patient will continue to benefit from skilled outpatient physical therapy to address the deficits listed in the problem list box on initial evaluation, provide pt/family education and to maximize pt's level of independence in the home and community environment.     Patient's spiritual, cultural, and educational needs considered and patient agreeable to plan of care and goals.           Plan: Will continue with current POC as tolerated by pt.    Goals:   Active       Short term        Report decreased in pain at worse less than  <   / =  8  /10  to increase tolerance for functional mobility.On going (Met)       Start:  02/07/25    Expected End:  03/07/25    Resolved:  03/14/25         Pt to improve R knee flexion active range of motion by 25% to allow for improved functional mobility to allow for improvement in IADL's. .On going (Met)       Start:  02/07/25    Expected End:  03/07/25    Resolved:  03/14/25         Increased RLE MMT 1/2 grade to increase tolerance for ADL and work activities.On going (Met)       Start:  02/07/25    Expected End:  03/07/25    Resolved:  03/14/25         Pt to report ability to walk 2 blocks or more with minimal to no complications indicating improved tolerance to activity. On going (Progressing)       Start:  02/07/25    Expected End:  03/07/25            Pt to tolerate HEP to improve ROM and independence with ADL's.On going  (Met)       Start:  02/07/25    Expected End:  03/07/25    Resolved:  03/14/25            long term        Report decreased in pain at worse less than  <   / =  4  /10  to increase tolerance for functional mobility. On going       Start:  02/07/25    Expected End:  04/18/25            Pt to improve R knee flexion active range of  motion by 75% to allow for improved functional mobility to allow for improvement in IADL's. On going       Start:  02/07/25    Expected End:  04/18/25            Increased RLE MMT 1 grade to increase tolerance for ADL and work activities.On going       Start:  02/07/25    Expected End:  04/18/25            Pt will report 64% or more on FOTO knee survey  to demonstrate increase in LE function with every day tasks. On going       Start:  02/07/25    Expected End:  04/18/25            Pt to be Independent with HEP to improve ROM and independence with ADL's. On going        Start:  02/07/25    Expected End:  04/18/25                Bruna Grimes, PT

## 2025-03-18 ENCOUNTER — CLINICAL SUPPORT (OUTPATIENT)
Dept: REHABILITATION | Facility: OTHER | Age: 35
End: 2025-03-18
Payer: MEDICAID

## 2025-03-18 DIAGNOSIS — M25.661 DECREASED ROM OF RIGHT KNEE: Primary | ICD-10-CM

## 2025-03-18 DIAGNOSIS — R29.898 DECREASED STRENGTH INVOLVING KNEE JOINT: ICD-10-CM

## 2025-03-18 PROCEDURE — 97112 NEUROMUSCULAR REEDUCATION: CPT | Mod: CQ

## 2025-03-18 PROCEDURE — 97110 THERAPEUTIC EXERCISES: CPT | Mod: CQ

## 2025-03-18 NOTE — PROGRESS NOTES
"Outpatient Rehab    Physical Therapy Progress Note    Patient Name: Emmanuelle Hawkins  MRN: [id number]  YOB: 1990  Encounter Date: 3/18/2025    Therapy Diagnosis:   Encounter Diagnoses   Name Primary?    Decreased ROM of right knee Yes    Decreased strength involving knee joint        Physician: Anny Jimenez NP    Physician Orders: Eval and Treat  Medical Diagnosis from Referral: M25.561 (ICD-10-CM) - Right medial knee pain   Evaluation Date: 2/7/2025  Authorization Period Expiration: 1/17/25 to 1/17/26  Plan of Care Expiration: 1/17/25 to 4/18/25  Visit # / Visits authorized: 5/20   Progress Note Due: 4/14/25  FOTO: 1/ 1     Time In: 0200   Time Out: 0250  Total Time: 50   Total Billable Time: 50 (with PT tech assist)    FOTO:  Intake Score:  %  Survey Score 1:  %  Survey Score 2:  %         Subjective   She feels like it is a mind thing. Since her knee gave out the one times, she is still afraid to put weight on it. She doesn't have a lot of pain..  Pain reported as 2/10.      Objective              Treatment:  Therapeutic Exercise  TE 1: SAQ, 2x10x3" +2#  TE 2: LAQ c/ focus on eccentric control, 2x10x3"- np  TE 3: SLR c/ quad activation, x10x3"  TE 4: seated assisted knee flexion c OP, x10x10"-np  TE 5: tailgaters, 2'-np  TE 6: seated hamstring curls, 2x10x3"-np  TE 8: SL clams, RTB x 20  TE 9: Nustep, 6'  TE 10: standing hip ABD/Ext, 2x10 - np    Manual Therapy  MT 1: Passive R quad stretch, STM to R hamstring    Balance/Neuromuscular Re-Education  NMR 1: iso hip adduction, 2x10x5 sec  NMR 2: tandem stance, x2x30 sec- np  NMR 4: Weight shifting for load acceptance (ant, lateral, posterior) x 20 ea                   Assessment & Plan   Assessment: Pt presents to therapy with low levels of pain. Pt demonstrates fear of movement and palpation. Session was limited due to cramping in anterior and posterior thigh after 10 reps of SLR. Pt was unable to continue session due to pain.  Evaluation/Treatment " Tolerance: Patient limited by pain    Patient will continue to benefit from skilled outpatient physical therapy to address the deficits listed in the problem list box on initial evaluation, provide pt/family education and to maximize pt's level of independence in the home and community environment.     Patient's spiritual, cultural, and educational needs considered and patient agreeable to plan of care and goals.           Plan: Will continue with current POC as tolerated by pt.    Goals:   Active       Short term        Report decreased in pain at worse less than  <   / =  8  /10  to increase tolerance for functional mobility.On going (Met)       Start:  02/07/25    Expected End:  03/07/25    Resolved:  03/14/25         Pt to improve R knee flexion active range of motion by 25% to allow for improved functional mobility to allow for improvement in IADL's. .On going (Met)       Start:  02/07/25    Expected End:  03/07/25    Resolved:  03/14/25         Increased RLE MMT 1/2 grade to increase tolerance for ADL and work activities.On going (Met)       Start:  02/07/25    Expected End:  03/07/25    Resolved:  03/14/25         Pt to report ability to walk 2 blocks or more with minimal to no complications indicating improved tolerance to activity. On going (Progressing)       Start:  02/07/25    Expected End:  03/07/25            Pt to tolerate HEP to improve ROM and independence with ADL's.On going  (Met)       Start:  02/07/25    Expected End:  03/07/25    Resolved:  03/14/25            long term        Report decreased in pain at worse less than  <   / =  4  /10  to increase tolerance for functional mobility. On going (Progressing)       Start:  02/07/25    Expected End:  04/18/25            Pt to improve R knee flexion active range of motion by 75% to allow for improved functional mobility to allow for improvement in IADL's. On going (Progressing)       Start:  02/07/25    Expected End:  04/18/25            Increased  RLE MMT 1 grade to increase tolerance for ADL and work activities.On going (Progressing)       Start:  02/07/25    Expected End:  04/18/25            Pt will report 64% or more on FOTO knee survey  to demonstrate increase in LE function with every day tasks. On going (Progressing)       Start:  02/07/25    Expected End:  04/18/25            Pt to be Independent with HEP to improve ROM and independence with ADL's. On going  (Progressing)       Start:  02/07/25    Expected End:  04/18/25                Zay Silva, PTA

## 2025-04-01 ENCOUNTER — CLINICAL SUPPORT (OUTPATIENT)
Dept: REHABILITATION | Facility: OTHER | Age: 35
End: 2025-04-01
Payer: MEDICAID

## 2025-04-01 DIAGNOSIS — M25.661 DECREASED ROM OF RIGHT KNEE: Primary | ICD-10-CM

## 2025-04-01 PROCEDURE — 97110 THERAPEUTIC EXERCISES: CPT | Mod: CQ

## 2025-04-01 NOTE — PROGRESS NOTES
"Outpatient Rehab    Physical Therapy Progress Note    Patient Name: Emmanuelle Hawkins  MRN: [id number]  YOB: 1990  Encounter Date: 4/1/2025    Therapy Diagnosis:   No diagnosis found.      Physician: No ref. provider found    Physician Orders: Eval and Treat  Medical Diagnosis from Referral: M25.561 (ICD-10-CM) - Right medial knee pain   Evaluation Date: 2/7/2025  Authorization Period Expiration: 1/17/25 to 1/17/26  Plan of Care Expiration: 1/17/25 to 4/18/25  Visit # / Visits authorized: 5/20   Progress Note Due: 4/14/25  FOTO: 1/ 1     Time In:     Time Out:    Total Time:     Total Billable Time: 50 (with PT tech assist)    FOTO:  Intake Score:  %  Survey Score 1:  %  Survey Score 2:  %         Subjective   [facility] She is hurting on the medial part of her knee.  Pain reported as 5/10.      Objective              Treatment:  Therapeutic Exercise  TE 1: SAQ, 2x10x3" +2#  TE 2: LAQ c/ focus on eccentric control, 2x10x3" +2#  TE 3: SLR c/ quad activation, x10x3"  TE 4: prone HS & claf roll out then light quad stretch  TE 5: STS 2x10  TE 6: SL press 60# 2x10  TE 7: wedge stretch 5x30 seconds  TE 8: SL clams, RTB x 20  TE 9: Nustep, 6'  TE 10: standing hip ABD/Ext, 2x10 -                             Assessment & Plan   Assessment: Pt presents to therapy in moderate pain. Pt c/o spasms throughout session. Spasms relieved by wedge stretching. Better tolerance to closed chain strengthening       Patient will continue to benefit from skilled outpatient physical therapy to address the deficits listed in the problem list box on initial evaluation, provide pt/family education and to maximize pt's level of independence in the home and community environment.     Patient's spiritual, cultural, and educational needs considered and patient agreeable to plan of care and goals.           Plan:      Goals:   Active       Short term        Report decreased in pain at worse less than  <   / " =  8  /10  to increase tolerance for functional mobility.On going (Met)       Start:  02/07/25    Expected End:  03/07/25    Resolved:  03/14/25         Pt to improve R knee flexion active range of motion by 25% to allow for improved functional mobility to allow for improvement in IADL's. .On going (Met)       Start:  02/07/25    Expected End:  03/07/25    Resolved:  03/14/25         Increased RLE MMT 1/2 grade to increase tolerance for ADL and work activities.On going (Met)       Start:  02/07/25    Expected End:  03/07/25    Resolved:  03/14/25         Pt to report ability to walk 2 blocks or more with minimal to no complications indicating improved tolerance to activity. On going (Progressing)       Start:  02/07/25    Expected End:  03/07/25            Pt to tolerate HEP to improve ROM and independence with ADL's.On going  (Met)       Start:  02/07/25    Expected End:  03/07/25    Resolved:  03/14/25            long term        Report decreased in pain at worse less than  <   / =  4  /10  to increase tolerance for functional mobility. On going (Progressing)       Start:  02/07/25    Expected End:  04/18/25            Pt to improve R knee flexion active range of motion by 75% to allow for improved functional mobility to allow for improvement in IADL's. On going (Progressing)       Start:  02/07/25    Expected End:  04/18/25            Increased RLE MMT 1 grade to increase tolerance for ADL and work activities.On going (Progressing)       Start:  02/07/25    Expected End:  04/18/25            Pt will report 64% or more on FOTO knee survey  to demonstrate increase in LE function with every day tasks. On going (Progressing)       Start:  02/07/25    Expected End:  04/18/25            Pt to be Independent with HEP to improve ROM and independence with ADL's. On going  (Progressing)       Start:  02/07/25    Expected End:  04/18/25                Anthony Wick PTA

## 2025-04-10 ENCOUNTER — CLINICAL SUPPORT (OUTPATIENT)
Dept: REHABILITATION | Facility: OTHER | Age: 35
End: 2025-04-10
Payer: MEDICAID

## 2025-04-10 DIAGNOSIS — M25.661 DECREASED ROM OF RIGHT KNEE: Primary | ICD-10-CM

## 2025-04-10 DIAGNOSIS — R29.898 DECREASED STRENGTH INVOLVING KNEE JOINT: ICD-10-CM

## 2025-04-10 PROCEDURE — 97530 THERAPEUTIC ACTIVITIES: CPT

## 2025-04-10 PROCEDURE — 97110 THERAPEUTIC EXERCISES: CPT

## 2025-04-10 NOTE — PROGRESS NOTES
"Outpatient Rehab    Physical Therapy Progress Note    Patient Name: Emmanuelle Hawkins  MRN: [id number]  YOB: 1990  Encounter Date: 4/10/2025    Therapy Diagnosis:   Encounter Diagnoses   Name Primary?    Decreased ROM of right knee Yes    Decreased strength involving knee joint      Physician: Anny Jimenze NP    Physician Orders: Eval and Treat  Medical Diagnosis from Referral: M25.561 (ICD-10-CM) - Right medial knee pain   Evaluation Date: 2/7/2025  Authorization Period Expiration: 1/17/25 to 1/17/26  Plan of Care Expiration: 1/17/25 to 4/18/25  Visit # / Visits authorized: 8/20   Progress Note Due: 04/18/25  FOTO: 2/ 3       Time In:   10:50 am  Time Out:  11:50 am   Total Time:   60 min   Total Billable Time: 60 min     FOTO:  Intake Score:  %  Survey Score 1:  %  Survey Score 2:  %         Subjective   Patient report taking bus to tx today including amb 5 blocks from home to bus stop.  Patient note no sig inc in R knee sx, however, noted improved lower leg sx /c application of stretch learned at last tx session.  Patient report intermittent HEP performance noting "spasms" inhibit her doing them.  Patient note spasms and swelling bring her pain to 10/10 including going to ED 03/27/25 (ED note specify vomiting as reason for visit /c no note of knee pain).    Patient reports MD visit yesterday noting continue R knee discomfort /c result in MRI ordered.  Patient plans on discontinuing care here by 04/23/25 per instruction of ..  Pain reported as 5/10.      Objective             Knee Range of Motion   Right Knee   EVAL 04/10/25      Active/Passive (deg) Active/Passive (deg) Pain   Flexion 83/90 90/90 Yes   Extension 0 0         Knee Strength   EVAL  04/10/25      R/ L Strength R/ L Strength    Flexion (S2) 3-/5 4  P! At knee   Prone Flexion        Extension (L3) 4-/5 4+/5 P! At knee           CMS Impairment/Limitation/Restriction for FOTO Knee Survey     Therapist reviewed FOTO scores for Emmanuelle Hawkins " "on 2/7/2025.   FOTO documents entered into [employer] - see Media section.     INTAKE Score: 36% (47.5 KOOS)   04/10/25:  48% (57.1 KOOS)     Goal Score: 64% (65 KOOS)                Treatment:  Therapeutic Exercise  TE 1: NuStep 6 min for functional endurance  TE 2: PT reassessment (see above)  TE 3: Supine R HS stretch from 90/90 2 x 20 sec hold for spasm management  TE 4: Seated R HS stretch 2 x 20 sec hold for spasm management cue for upright chest/posture      Therapeutic Activity  TA 1: Diaphragmatic breathing exercise (see below)  Diaphragmatic breathing:   Verbal cues for unlabored, long & relaxed breathing w/ increased time for exhalation  Awareness of pulling breath down away from mouth to hips  Cues for for proper abdominal excursion & decreased use of accessory muscles of breathing (hand on stomach & on chest; increased stomach motion, decreased chest motion)  Education on inhalation activating sympathetic nervous system   Education on exhalation activating parasympathetic nervous system  Performed supine            Assessment & Plan   Assessment: Per patient goal, patient report "somewhat" improvement in knee "trust" noting reciprocal stepping for stairs at apartment.  Updated FOTO score mirror this /c sig improvement indicating improved functionality and dec sx presentation.  PT reassessment also identify strength improvements but no sig change in pain /c palpation and R knee ROM.  Patient subjective report also indicating improve amb tolerance thereby meeting associated goal.  However, patient continue to perseverate on perceived pain including noting recent ED visit.  Patient continue to present with pain, impaired mobility, decreased AROM, fall risk, gait deviations, decreased endurance, fear of pain /c central sensitization, increased psychosocial concerns, & inability to perform ADL's as before due to current functional status. Significant tx time spent today educating patient on pain science interests " including use of diaphragmatic breathing to calm autonomic nervous system.   Patient demonstrate improved motor control and report improved breathing sensations (no dizziness) thru session.  Patient encouraged to perform controlled breathing in situations expected to inc R knee discomfort to calm ANS and allow for improved body awareness with activity.  Despite noted subjective and objective improvements, patient's increased psychosocial concerns /c central sensitization present an unstable clinical presentation which may continue to limit progress.  Evaluation/Treatment Tolerance: Other (Comment) (Tx limited by pain and central sensitization)    Patient will continue to benefit from skilled outpatient physical therapy to address the deficits listed in the problem list box on initial evaluation, provide pt/family education and to maximize pt's level of independence in the home and community environment.     Patient's spiritual, cultural, and educational needs considered and patient agreeable to plan of care and goals.           Plan: Will continue with current POC as tolerated by pt.   However, pt note likely discharge by end of 04/2025 per instruction by .    Goals:   Active       Short term        Report decreased in pain at worse less than  <   / =  8  /10  to increase tolerance for functional mobility.On going (Progressing)       Start:  02/07/25    Expected End:  04/23/25            Pt to improve R knee flexion active range of motion by 25% to allow for improved functional mobility to allow for improvement in IADL's. .On going (Progressing)       Start:  02/07/25    Expected End:  04/23/25            Increased RLE MMT 1/2 grade to increase tolerance for ADL and work activities.On going (Met)       Start:  02/07/25    Expected End:  03/07/25    Resolved:  03/14/25         Pt to report ability to walk 2 blocks or more with minimal to no complications indicating improved tolerance to activity. On going (Met)        Start:  02/07/25    Expected End:  04/10/25    Resolved:  04/10/25         Pt to tolerate HEP to improve ROM and independence with ADL's.On going  (Progressing)       Start:  02/07/25    Expected End:  04/23/25               long term        Report decreased in pain at worse less than  <   / =  4  /10  to increase tolerance for functional mobility. On going (Progressing)       Start:  02/07/25    Expected End:  04/18/25            Pt to improve R knee flexion active range of motion by 75% to allow for improved functional mobility to allow for improvement in IADL's. On going (Progressing)       Start:  02/07/25    Expected End:  04/18/25            Increased RLE MMT 1 grade to increase tolerance for ADL and work activities.On going (Progressing)       Start:  02/07/25    Expected End:  04/18/25            Pt will report 64% or more on FOTO knee survey  to demonstrate increase in LE function with every day tasks. On going (Progressing)       Start:  02/07/25    Expected End:  04/18/25            Pt to be Independent with HEP to improve ROM and independence with ADL's. On going  (Progressing)       Start:  02/07/25    Expected End:  04/18/25                Ike Bonner, PT

## 2025-05-01 DIAGNOSIS — G89.29 CHRONIC PAIN OF RIGHT KNEE: Primary | ICD-10-CM

## 2025-05-01 DIAGNOSIS — M25.561 CHRONIC PAIN OF RIGHT KNEE: Primary | ICD-10-CM

## 2025-06-08 ENCOUNTER — HOSPITAL ENCOUNTER (EMERGENCY)
Facility: HOSPITAL | Age: 35
Discharge: HOME | End: 2025-06-08
Attending: STUDENT IN AN ORGANIZED HEALTH CARE EDUCATION/TRAINING PROGRAM
Payer: MEDICAID

## 2025-06-08 VITALS
WEIGHT: 185 LBS | HEIGHT: 62 IN | HEART RATE: 73 BPM | SYSTOLIC BLOOD PRESSURE: 136 MMHG | BODY MASS INDEX: 34.04 KG/M2 | DIASTOLIC BLOOD PRESSURE: 101 MMHG | TEMPERATURE: 99 F | RESPIRATION RATE: 18 BRPM | OXYGEN SATURATION: 99 %

## 2025-06-08 DIAGNOSIS — K02.9 PAIN DUE TO DENTAL CARIES: Primary | ICD-10-CM

## 2025-06-08 PROCEDURE — 64400 NJX AA&/STRD TRIGEMINAL NRV: CPT | Mod: RT

## 2025-06-08 PROCEDURE — 25000003 PHARM REV CODE 250

## 2025-06-08 PROCEDURE — 99284 EMERGENCY DEPT VISIT MOD MDM: CPT | Mod: 25

## 2025-06-08 RX ORDER — OXYCODONE HYDROCHLORIDE 5 MG/1
5 TABLET ORAL EVERY 4 HOURS PRN
Qty: 6 TABLET | Refills: 0 | Status: SHIPPED | OUTPATIENT
Start: 2025-06-08 | End: 2025-06-08

## 2025-06-08 RX ORDER — HYDROCODONE BITARTRATE AND ACETAMINOPHEN 5; 325 MG/1; MG/1
1 TABLET ORAL
Refills: 0 | Status: COMPLETED | OUTPATIENT
Start: 2025-06-08 | End: 2025-06-08

## 2025-06-08 RX ORDER — AMOXICILLIN AND CLAVULANATE POTASSIUM 875; 125 MG/1; MG/1
1 TABLET, FILM COATED ORAL 2 TIMES DAILY
Qty: 14 TABLET | Refills: 0 | Status: SHIPPED | OUTPATIENT
Start: 2025-06-08

## 2025-06-08 RX ORDER — LIDOCAINE HYDROCHLORIDE 10 MG/ML
10 INJECTION, SOLUTION INFILTRATION; PERINEURAL
Status: DISCONTINUED | OUTPATIENT
Start: 2025-06-08 | End: 2025-06-08 | Stop reason: HOSPADM

## 2025-06-08 RX ORDER — OXYCODONE HYDROCHLORIDE 5 MG/1
5 TABLET ORAL EVERY 4 HOURS PRN
Qty: 12 TABLET | Refills: 0 | Status: SHIPPED | OUTPATIENT
Start: 2025-06-08

## 2025-06-08 RX ADMIN — HYDROCODONE BITARTRATE AND ACETAMINOPHEN 1 TABLET: 5; 325 TABLET ORAL at 10:06

## 2025-06-08 NOTE — DISCHARGE INSTRUCTIONS
You had a dental block in the emergency department to help you with the pain. I sent your prescription for pain medication and antibiotics to the pharmacy.  You can take the oxycodone as needed for pain. Do not take any other narcotics with this. [facility] accepts Medicaid and accepts walk ins until 11:00 a.m. Please follow up with dentist as soon as possible.  If her symptoms worsen or you develop any new symptoms, report to the emergency department for evaluation.

## 2025-06-08 NOTE — ED TRIAGE NOTES
Emmanuelle Hawkins, a 34 y.o. female presents to the ED w/ complaint of right side facial pain since last Wednesday    Triage note:  Chief Complaint   Patient presents with    Facial Pain     Right sided facial pain,started in her bottom teeth now into her upper teeth and gums, been trying to get a dentist appt but can't get in     Review of patient's allergies indicates:   Allergen Reactions    Iodine and iodide containing products Hives           APPEARANCE: awake and alert in NAD. PAIN  10/10  SKIN: warm, dry and intact. No breakdown or bruising.  MUSCULOSKELETAL: Patient moving all extremities spontaneously, no obvious swelling or deformities noted. Ambulates independently.  RESPIRATORY: Denies shortness of breath.Respirations unlabored.   CARDIAC: Denies CP, 2+ distal pulses; no peripheral edema  ABDOMEN: S/ND/NT, Denies nausea  : voids spontaneously, denies difficulty  Neurologic: AAO x 4; follows commands equal strength in all extremities; denies numbness/tingling. Denies dizziness

## 2025-06-08 NOTE — ED PROVIDER NOTES
Encounter Date: 2025       History     Chief Complaint   Patient presents with    Facial Pain     Right sided facial pain,started in her bottom teeth now into her upper teeth and gums, been trying to get a dentist appt but can't get in     34-year-old female with past medical history of sickle cell trait who presents to the emergency department complaining of right upper dental pain for 1.5 weeks. She is now having some facial pain/swelling for few days.  She has had a difficult time finding a dentist that accepts her Medicaid insurance. She is able to tolerate PO. She denies any fever, chills, nausea, vomiting, shortness of breath, CP, URI symptoms        Review of patient's allergies indicates:   Allergen Reactions    Iodine and iodide containing products Hives     Past Medical History:   Diagnosis Date    Sickle cell trait      Past Surgical History:   Procedure Laterality Date     SECTION       No family history on file.  Social History[1]  Review of Systems    Physical Exam     Initial Vitals [25 0919]   BP Pulse Resp Temp SpO2   (!) 147/107 109 18 98.2 °F (36.8 °C) 99 %      MAP       --         Physical Exam    Nursing note and vitals reviewed.  Constitutional: She appears well-developed and well-nourished.   Patient is tearful, holding the right side of her face   HENT:   Head: Normocephalic and atraumatic. Mouth/Throat: Uvula is midline, oropharynx is clear and moist and mucous membranes are normal. No trismus in the jaw. Dental caries (dental caries with multiple fillings) present. No dental abscesses or uvula swelling. No oropharyngeal exudate, posterior oropharyngeal edema or posterior oropharyngeal erythema.   Very mild right facial swelling with tenderness noted to the right frontal sinus   Eyes: EOM are normal.   Neck: Neck supple.   Normal range of motion.  Cardiovascular:  Normal rate and regular rhythm.           Musculoskeletal:      Cervical back: Normal range of motion and neck  supple.           ED Course   Nerve Block    Date/Time: 2025 10:33 AM  Location procedure was performed: [facility] EMERGENCY DEPARTMENT    Performed by: Vikki Palma PA-C  Authorized by: Garret Aquino MD  Consent Done: Yes  Consent: Verbal consent obtained  Risks and benefits: risks, benefits and alternatives were discussed  Consent given by: patient  Patient understanding: patient states understanding of the procedure being performed  Patient identity confirmed: , MRN and name  Indications: pain relief  Body area: face/mouth  Nerve: posterior superior alveolar  Laterality: right    Patient sedated: no  Patient position: supine  Needle size: 25 G  Local Anesthetic: lidocaine 1% without epinephrine  Complications: No  Outcome: pain improved  Patient tolerance: Patient tolerated the procedure well with no immediate complications        Labs Reviewed - No data to display       Imaging Results    None          Medications   LIDOcaine HCL 10 mg/ml (1%) injection 10 mL (has no administration in time range)   HYDROcodone-acetaminophen 5-325 mg per tablet 1 tablet (1 tablet Oral Given 25 1001)     Medical Decision Making  34-year-old female with past medical history of sickle cell trait who presents to the emergency department complaining of right upper dental pain for 1.5 weeks.  With associated facial pain and swelling.  Physical exam as above    Plan to do a dental block, prescribed antibiotics and oxycodone. Will give Norco here, patient is not driving. Provide printed resources.  Instructed patient to follow up with dentist as soon as possible. Discussed with patient  all pertinent ED information and results. Discussed pt dx and plan of tx. Gave patient all f/u and return to the ED instructions. All questions and concerns were addressed at this time. Patient  expresses understanding of information and instructions, and is comfortable with plan to discharge. Pt is stable for discharge.    I discussed with  patient that evaluation in the ED does not suggest any emergent or life threatening medical conditions requiring immediate intervention beyond what was provided in the ED, and I believe patient is safe for discharge.  Regardless, an unremarkable evaluation in the ED does not preclude the development or presence of a serious of life threatening condition. As such, it was instructed that the patient return immediately for any worsening or change in current symptoms.      Risk  Prescription drug management.                                      Clinical Impression:  Final diagnoses:  [K02.9] Pain due to dental caries (Primary)          ED Disposition Condition    Discharge Stable          ED Prescriptions       Medication Sig Dispense Start Date End Date Auth. Provider    amoxicillin-clavulanate 875-125mg (AUGMENTIN) 875-125 mg per tablet Take 1 tablet by mouth 2 (two) times daily. 14 tablet 6/8/2025 -- Vikki Palma PA-C    oxyCODONE (ROXICODONE) 5 MG immediate release tablet  (Status: Discontinued) Take 1 tablet (5 mg total) by mouth every 4 (four) hours as needed for Pain. 6 tablet 6/8/2025 6/8/2025 Vikki Palma PA-C    oxyCODONE (ROXICODONE) 5 MG immediate release tablet Take 1 tablet (5 mg total) by mouth every 4 (four) hours as needed for Pain. 12 tablet 6/8/2025 -- Vikki Palma PA-C          Follow-up Information       Follow up With Specialties Details Why Contact [facility] - Emergency Dept Emergency Medicine Go to  If symptoms worsen [id number]  [facility] [id number] [phone]                   [1]   Social History  Tobacco Use    Smoking status: Every Day     Current packs/day: 0.50     Types: Cigarettes    Smokeless tobacco: Never   Substance Use Topics    Alcohol use: Yes     Comment: beer every other day     Drug use: Yes     Types: Marijuana        Vikki Palma PA-C  06/08/25 [id number]

## 2025-07-22 ENCOUNTER — HOSPITAL ENCOUNTER (EMERGENCY)
Facility: HOSPITAL | Age: 35
Discharge: HOME | End: 2025-07-22
Attending: EMERGENCY MEDICINE
Payer: MEDICAID

## 2025-07-22 VITALS
SYSTOLIC BLOOD PRESSURE: 129 MMHG | DIASTOLIC BLOOD PRESSURE: 88 MMHG | HEIGHT: 62 IN | RESPIRATION RATE: 18 BRPM | TEMPERATURE: 99 F | BODY MASS INDEX: 33.13 KG/M2 | HEART RATE: 90 BPM | WEIGHT: 180 LBS | OXYGEN SATURATION: 98 %

## 2025-07-22 DIAGNOSIS — R51.9 ACUTE NONINTRACTABLE HEADACHE, UNSPECIFIED HEADACHE TYPE: Primary | ICD-10-CM

## 2025-07-22 LAB
ABSOLUTE EOSINOPHIL (OHS): 0.07 K/UL
ABSOLUTE MONOCYTE (OHS): 0.67 K/UL (ref 0.3–1)
ABSOLUTE NEUTROPHIL COUNT (OHS): 6.44 K/UL (ref 1.8–7.7)
ALBUMIN SERPL BCP-MCNC: 3.9 G/DL (ref 3.5–5.2)
ALP SERPL-CCNC: 51 UNIT/L (ref 40–150)
ALT SERPL W/O P-5'-P-CCNC: 14 UNIT/L (ref 10–44)
ANION GAP (OHS): 7 MMOL/L (ref 8–16)
AST SERPL-CCNC: 13 UNIT/L (ref 11–45)
B-HCG UR QL: NEGATIVE
BASOPHILS # BLD AUTO: 0.07 K/UL
BASOPHILS NFR BLD AUTO: 0.7 %
BILIRUB SERPL-MCNC: 0.8 MG/DL (ref 0.1–1)
BUN SERPL-MCNC: 7 MG/DL (ref 6–20)
CALCIUM SERPL-MCNC: 9.1 MG/DL (ref 8.7–10.5)
CHLORIDE SERPL-SCNC: 106 MMOL/L (ref 95–110)
CO2 SERPL-SCNC: 22 MMOL/L (ref 23–29)
CREAT SERPL-MCNC: 0.7 MG/DL (ref 0.5–1.4)
CTP QC/QA: YES
ERYTHROCYTE [DISTWIDTH] IN BLOOD BY AUTOMATED COUNT: 13.2 % (ref 11.5–14.5)
GFR SERPLBLD CREATININE-BSD FMLA CKD-EPI: >60 ML/MIN/1.73/M2
GLUCOSE SERPL-MCNC: 92 MG/DL (ref 70–110)
HCT VFR BLD AUTO: 36.5 % (ref 37–48.5)
HCV AB SERPL QL IA: NORMAL
HGB BLD-MCNC: 12.6 GM/DL (ref 12–16)
HIV 1+2 AB+HIV1 P24 AG SERPL QL IA: NORMAL
IMM GRANULOCYTES # BLD AUTO: 0.03 K/UL (ref 0–0.04)
IMM GRANULOCYTES NFR BLD AUTO: 0.3 % (ref 0–0.5)
LYMPHOCYTES # BLD AUTO: 2.38 K/UL (ref 1–4.8)
MCH RBC QN AUTO: 31.1 PG (ref 27–31)
MCHC RBC AUTO-ENTMCNC: 34.5 G/DL (ref 32–36)
MCV RBC AUTO: 90 FL (ref 82–98)
NUCLEATED RBC (/100WBC) (OHS): 0 /100 WBC
PLATELET # BLD AUTO: 322 K/UL (ref 150–450)
PMV BLD AUTO: 9.5 FL (ref 9.2–12.9)
POTASSIUM SERPL-SCNC: 4.1 MMOL/L (ref 3.5–5.1)
PROT SERPL-MCNC: 7 GM/DL (ref 6–8.4)
RBC # BLD AUTO: 4.05 M/UL (ref 4–5.4)
RELATIVE EOSINOPHIL (OHS): 0.7 %
RELATIVE LYMPHOCYTE (OHS): 24.6 % (ref 18–48)
RELATIVE MONOCYTE (OHS): 6.9 % (ref 4–15)
RELATIVE NEUTROPHIL (OHS): 66.8 % (ref 38–73)
SODIUM SERPL-SCNC: 135 MMOL/L (ref 136–145)
WBC # BLD AUTO: 9.66 K/UL (ref 3.9–12.7)

## 2025-07-22 PROCEDURE — 99285 EMERGENCY DEPT VISIT HI MDM: CPT | Mod: 25

## 2025-07-22 PROCEDURE — 96361 HYDRATE IV INFUSION ADD-ON: CPT

## 2025-07-22 PROCEDURE — 85025 COMPLETE CBC W/AUTO DIFF WBC: CPT | Performed by: PHYSICIAN ASSISTANT

## 2025-07-22 PROCEDURE — 25000003 PHARM REV CODE 250: Performed by: PHYSICIAN ASSISTANT

## 2025-07-22 PROCEDURE — 86803 HEPATITIS C AB TEST: CPT | Performed by: PHYSICIAN ASSISTANT

## 2025-07-22 PROCEDURE — 84132 ASSAY OF SERUM POTASSIUM: CPT | Performed by: PHYSICIAN ASSISTANT

## 2025-07-22 PROCEDURE — 63600175 PHARM REV CODE 636 W HCPCS: Performed by: PHYSICIAN ASSISTANT

## 2025-07-22 PROCEDURE — 96374 THER/PROPH/DIAG INJ IV PUSH: CPT

## 2025-07-22 PROCEDURE — 81025 URINE PREGNANCY TEST: CPT | Performed by: PHYSICIAN ASSISTANT

## 2025-07-22 PROCEDURE — 25500020 PHARM REV CODE 255: Performed by: EMERGENCY MEDICINE

## 2025-07-22 PROCEDURE — 87389 HIV-1 AG W/HIV-1&-2 AB AG IA: CPT | Performed by: PHYSICIAN ASSISTANT

## 2025-07-22 PROCEDURE — 96375 TX/PRO/DX INJ NEW DRUG ADDON: CPT

## 2025-07-22 RX ORDER — KETOROLAC TROMETHAMINE 30 MG/ML
15 INJECTION, SOLUTION INTRAMUSCULAR; INTRAVENOUS
Status: COMPLETED | OUTPATIENT
Start: 2025-07-22 | End: 2025-07-22

## 2025-07-22 RX ORDER — PROCHLORPERAZINE EDISYLATE 5 MG/ML
10 INJECTION INTRAMUSCULAR; INTRAVENOUS
Status: COMPLETED | OUTPATIENT
Start: 2025-07-22 | End: 2025-07-22

## 2025-07-22 RX ORDER — ACETAMINOPHEN 500 MG
1000 TABLET ORAL
Status: COMPLETED | OUTPATIENT
Start: 2025-07-22 | End: 2025-07-22

## 2025-07-22 RX ORDER — DIPHENHYDRAMINE HYDROCHLORIDE 50 MG/ML
50 INJECTION, SOLUTION INTRAMUSCULAR; INTRAVENOUS
Status: COMPLETED | OUTPATIENT
Start: 2025-07-22 | End: 2025-07-22

## 2025-07-22 RX ORDER — DIPHENHYDRAMINE HCL 50 MG
50 CAPSULE ORAL
Status: DISCONTINUED | OUTPATIENT
Start: 2025-07-22 | End: 2025-07-22

## 2025-07-22 RX ADMIN — KETOROLAC TROMETHAMINE 15 MG: 30 INJECTION, SOLUTION INTRAMUSCULAR; INTRAVENOUS at 01:07

## 2025-07-22 RX ADMIN — PROCHLORPERAZINE EDISYLATE 10 MG: 5 INJECTION INTRAMUSCULAR; INTRAVENOUS at 11:07

## 2025-07-22 RX ADMIN — ACETAMINOPHEN 1000 MG: 500 TABLET ORAL at 11:07

## 2025-07-22 RX ADMIN — IOHEXOL 75 ML: 350 INJECTION, SOLUTION INTRAVENOUS at 01:07

## 2025-07-22 RX ADMIN — DIPHENHYDRAMINE HYDROCHLORIDE 50 MG: 50 INJECTION, SOLUTION INTRAMUSCULAR; INTRAVENOUS at 12:07

## 2025-07-22 RX ADMIN — SODIUM CHLORIDE 1000 ML: 9 INJECTION, SOLUTION INTRAVENOUS at 11:07

## 2025-07-22 NOTE — DISCHARGE INSTRUCTIONS
I suspect you had a migraine headache.  I Recommend that you take Excedrin over-the-counter as needed for headaches. Strict ED precautions given to return immediately for new, worsening, or concerning symptoms

## 2025-07-22 NOTE — ED TRIAGE NOTES
Pt reports headache, dizziness, generalized weakness, and decreased appetite x2 days. Pt reports recently finished antibiotics for a dental infection.

## 2025-07-22 NOTE — ED PROVIDER NOTES
"Encounter Date: 2025       History     Chief Complaint   Patient presents with    Headache     Pt c/o headache, states "throbbing".  Pt has had poor appetite and weakness.       35-year-old female with a PMHx of sickle cell treat presents to the ED with frontal headache x2 days. PL=10/10. Headache was gradual in onset.  It started while she was lying down.  She has associated photophobia and dizziness.  She has tried taking Motrin and Tylenol without improvement.  She has a history of migraines as a child though has not had 1 in several years.  He denies fever, chills, visual changes, neck pain, paresthesias, focal weakness.    The history is provided by the patient.     Review of patient's allergies indicates:   Allergen Reactions    Iodine and iodide containing products Hives     Past Medical History:   Diagnosis Date    Sickle cell trait      Past Surgical History:   Procedure Laterality Date     SECTION       No family history on file.  Social History[1]  Review of Systems    Physical Exam     Initial Vitals [25 1006]   BP Pulse Resp Temp SpO2   132/86 102 16 100 °F (37.8 °C) 98 %      MAP       --         Physical Exam    Nursing note and vitals reviewed.  Constitutional: She appears well-developed and well-nourished. She is not diaphoretic.   Tearful, appears in pain   HENT:   Head: Normocephalic and atraumatic.   Nose: Nose normal.   Eyes: Conjunctivae and EOM are normal.   Neck: Neck supple.   Cardiovascular:  Normal rate.           Pulmonary/Chest: No respiratory distress.   Musculoskeletal:      Cervical back: Neck supple.     Neurological: She is alert and oriented to person, place, and time. She has normal strength. No sensory deficit. She exhibits normal muscle tone. GCS eye subscore is 4. GCS verbal subscore is 5. GCS motor subscore is 6.   Alert, conversational.  No dysarthria or dysmetria.  Symmetric facial features.  Finger to nose is intact. Off balance with ambulation   Skin: No " rash noted.   Psychiatric: She has a normal mood and affect. Thought content normal.         ED Course   Procedures  Labs Reviewed   COMPREHENSIVE METABOLIC PANEL - Abnormal       Result Value    Sodium 135 (*)     Potassium 4.1      Chloride 106      CO2 22 (*)     Glucose 92      BUN 7      Creatinine 0.7      Calcium 9.1      Protein Total 7.0      Albumin 3.9      Bilirubin Total 0.8      ALP 51      AST 13      ALT 14      Anion Gap 7 (*)     eGFR >60     CBC WITH DIFFERENTIAL - Abnormal    WBC 9.66      RBC 4.05      HGB 12.6      HCT 36.5 (*)     MCV 90      MCH 31.1 (*)     MCHC 34.5      RDW 13.2      Platelet Count 322      MPV 9.5      Nucleated RBC 0      Neut % 66.8      Lymph % 24.6      Mono % 6.9      Eos % 0.7      Basophil % 0.7      Imm Grans % 0.3      Neut # 6.44      Lymph # 2.38      Mono # 0.67      Eos # 0.07      Baso # 0.07      Imm Grans # 0.03     POCT URINE PREGNANCY - Abnormal    POC Preg Test, Ur Negative       Acceptable Yes     HEPATITIS C ANTIBODY - Normal    Hep C Ab Interp Non-Reactive     HIV 1 / 2 ANTIBODY - Normal    HIV 1/2 Ag/Ab Non-Reactive     CBC W/ AUTO DIFFERENTIAL    Narrative:     The following orders were created for panel order CBC auto differential.  Procedure                               Abnormality         Status                     ---------                               -----------         ------                     CBC with Differential[[id number]]       Abnormal            Final result                 Please view results for these tests on the individual orders.   HEP C VIRUS HOLD SPECIMEN          Imaging Results              CTA Head and Neck (xpd) (Final result)  Result time 07/22/25 13:23:53      Final result by Jerardo Browning DO (07/22/25 13:23:53)                   Impression:      CTA head: Unremarkable CTA of the head specifically without evidence for proximal significant stenosis or occlusion.    CTA neck: No significant arterial  stenosis throughout the neck.    CT head: No evidence for acute intracranial hemorrhage or definite abnormal parenchymal enhancement.    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  Component of intracranial hypertension to be considered in differential in appropriate clinical setting.      Electronically signed by: Jerardo Browning DO  Date:    07/22/2025  Time:    13:23               Narrative:    EXAMINATION:  CTA HEAD AND NECK (XPD)    CLINICAL HISTORY:  Dizziness, persistent/recurrent, cardiac or vascular cause suspected;headache and dizziness x2 days;    TECHNIQUE:  5 mm axial images of the head pre and post contrast with 0.625 mm axial CTA images of the head neck post-contrast.  Coronal and sagittal MPR and MIP imaging was performed 75 ml of Omnipaque 350 contrast was injected intravenously    COMPARISON:  CT head 04/08/2009    FINDINGS:  CT head with and  without contrast: No evidence for acute intracranial hemorrhage.  Mild prominence of the lateral and 3rd ventricles with 3rd ventricle measuring approximately 0.4 cm overall similar to prior.  No evidence for acute hydrocephalus.  There is slight prominent fluid signal along the superior aspect of the sella concerning for partially empty sella.  No abnormal parenchymal enhancement.  No sulcal effacement to suggest large territory recent infarction.  There is approximately 0.3 cm of inferior extension cerebellar tonsils below foramen magnum concerning for tonsillar ectopia.    CTA head:    Anterior circulation: The bilateral distal cervical, petrous, cavernous, and supraclinoid segments of the ICAs are patent without significant focal stenosis or aneurysm.    The anterior middle cerebral  arteries are patent without focal stenosis or aneurysm.    Posterior circulation: The distal vertebral arteries, basilar artery and posterior cerebral arteries are patent with somewhat small caliber posterior circulation with essentially persistent fetal circulation of the PCAs via bilateral posterior communicating arteries.    CTA neck: Common origin the right brachiocephalic and left common carotid artery and origin of the left subclavian artery from the arch are within normal limits.    The origin of the vertebral arteries from the respective subclavian arteries are within normal limits.  The vertebral arteries are patent throughout their course without focal stenosis or occlusion.    Right carotid: The right common carotid artery, carotid bifurcation and extracranial portions of the internal carotid artery are patent without significant focal stenosis.    Left carotid: The left common carotid artery, carotid bifurcation and extracranial portions of the internal carotid arteries are patent without significant focal stenosis.    Overall less than 50% proximal ICA stenosis by NASCET criteria.    Pharynx/larynx: No evidence for focal lesion throughout the pharynx/larynx allowing for artifact from motion and dental metal.    Oral cavity and the buccal space     distorted by dental metal.    Glands: No focal parotid, submandibular or thyroid gland lesion.    No evidence for adenopathy throughout the neck by size criteria.    No evidence for acute fracture or traumatic subluxation cervical spine.  No large consolidation visualized lung apices.  Subcentimeter nodule left upper lobe lung measuring 0.3 cm image 105 series 3.                                       Medications   prochlorperazine injection Soln 10 mg (10 mg Intravenous Given 7/22/25 1157)   acetaminophen tablet 1,000 mg (1,000 mg Oral Given 7/22/25 1157)   sodium chloride 0.9% bolus 1,000 mL 1,000 mL (0 mLs Intravenous Stopped 7/22/25 1257)   diphenhydrAMINE  injection 50 mg (50 mg Intravenous Given 7/22/25 1223)   iohexoL (OMNIPAQUE 350) injection 75 mL (75 mLs Intravenous Given 7/22/25 1309)   ketorolac injection 15 mg (15 mg Intravenous Given 7/22/25 1340)     Medical Decision Making  35-year-old female with a PMHx of sickle cell treat presents to the ED with frontal headache x2 days. Appears in pain. Mildly tachycardic. Exam as above. I will initiate workup and reassess.    Ddx:  Migraine, tension headache, ICH    Workup is reassuring.  CTA head and neck without acute abnormality.  Migraine cocktail given with improvement of patient's headache.  Patient reassessed and is feeling better.  She is neuro intact.  She is no longer having photophobia, dizziness or difficulty walking. I suspect she was having an atypical migraine. Follow up given with neurology for recurrent headaches.  Given clinical improvement and reassuring imaging findings she is stable at this time for discharge. Strict ED precautions given to return immediately for new, worsening, or concerning symptoms     Amount and/or Complexity of Data Reviewed  Labs: ordered. Decision-making details documented in ED Course.  Radiology: ordered.    Risk  OTC drugs.  Prescription drug management.               ED Course as of 07/22/25 1533   Tue Jul 22, 2025   1156 WBC: 9.66 [HM]      ED Course User Index  [HM] Kimmy Pablo PA-C                               Clinical Impression:  Final diagnoses:  [R51.9] Acute nonintractable headache, unspecified headache type (Primary)          ED Disposition Condition    Discharge Stable          ED Prescriptions    None       Follow-up Information       Follow up With Specialties Details Why Contact Info Additional Information    [facility] - Emergency Dept Emergency Medicine  If symptoms worsen [id number]  [facility] [id number] [phone]     [facility] - Neurology 7th Fl Neurology Schedule an appointment as soon as possible for a visit   [id number]  [facility] [id number]  [phone] [facility] - Main Building, 7th Floor Please park in [facility] and take Clinic elevator                   [1]   Social History  Tobacco Use    Smoking status: Every Day     Current packs/day: 0.50     Types: Cigarettes    Smokeless tobacco: Never   Substance Use Topics    Alcohol use: Yes     Comment: beer every other day     Drug use: Yes     Types: Marijuana        Kimmy Pablo PA-C  07/22/25 1533

## 2025-07-22 NOTE — Clinical Note
"Emmanuelle "Emmanuellefrank Hawkins was seen and treated in our emergency department on 7/22/2025.  She may return to work on 07/23/2025.       If you have any questions or concerns, please don't hesitate to call.      Kimmy Pablo PA-C"

## 2025-07-25 LAB — HOLD SPECIMEN: NORMAL
